# Patient Record
Sex: FEMALE | Race: WHITE | NOT HISPANIC OR LATINO | Employment: FULL TIME | URBAN - METROPOLITAN AREA
[De-identification: names, ages, dates, MRNs, and addresses within clinical notes are randomized per-mention and may not be internally consistent; named-entity substitution may affect disease eponyms.]

---

## 2017-01-06 ENCOUNTER — OFFICE VISIT (OUTPATIENT)
Dept: ORTHOPEDIC SURGERY | Facility: CLINIC | Age: 62
End: 2017-01-06

## 2017-01-06 VITALS — WEIGHT: 130 LBS | BODY MASS INDEX: 21.66 KG/M2 | HEIGHT: 65 IN

## 2017-01-06 DIAGNOSIS — M21.611 BUNION, RIGHT FOOT: ICD-10-CM

## 2017-01-06 DIAGNOSIS — M75.82 ROTATOR CUFF TENDONITIS, LEFT: ICD-10-CM

## 2017-01-06 DIAGNOSIS — M77.41 METATARSALGIA OF RIGHT FOOT: ICD-10-CM

## 2017-01-06 DIAGNOSIS — M20.11 HALLUX VALGUS, RIGHT: ICD-10-CM

## 2017-01-06 DIAGNOSIS — M79.671 RIGHT FOOT PAIN: ICD-10-CM

## 2017-01-06 PROBLEM — M75.80 ROTATOR CUFF TENDONITIS: Status: ACTIVE | Noted: 2017-01-06

## 2017-01-06 PROBLEM — M20.10 HALLUX VALGUS: Status: ACTIVE | Noted: 2017-01-06

## 2017-01-06 PROCEDURE — 73010 X-RAY EXAM OF SHOULDER BLADE: CPT | Performed by: ORTHOPAEDIC SURGERY

## 2017-01-06 PROCEDURE — 99214 OFFICE O/P EST MOD 30 MIN: CPT | Performed by: ORTHOPAEDIC SURGERY

## 2017-01-06 PROCEDURE — 73030 X-RAY EXAM OF SHOULDER: CPT | Performed by: ORTHOPAEDIC SURGERY

## 2017-01-06 PROCEDURE — 73630 X-RAY EXAM OF FOOT: CPT | Performed by: ORTHOPAEDIC SURGERY

## 2017-01-06 PROCEDURE — 20610 DRAIN/INJ JOINT/BURSA W/O US: CPT | Performed by: ORTHOPAEDIC SURGERY

## 2017-01-06 RX ORDER — LEVOTHYROXINE SODIUM 0.07 MG/1
75 TABLET ORAL EVERY MORNING
COMMUNITY
Start: 2016-10-21

## 2017-01-06 RX ORDER — ERGOCALCIFEROL 1.25 MG/1
50000 CAPSULE ORAL
COMMUNITY

## 2017-01-06 RX ORDER — CALCITONIN SALMON 200 [IU]/.09ML
1 SPRAY, METERED NASAL DAILY
COMMUNITY
End: 2018-09-06

## 2017-01-06 RX ORDER — LORAZEPAM 0.5 MG/1
TABLET ORAL
Refills: 0 | COMMUNITY
Start: 2016-11-14 | End: 2018-09-06

## 2017-01-06 RX ORDER — MELOXICAM 15 MG/1
TABLET ORAL
Qty: 60 TABLET | Refills: 0 | Status: SHIPPED | OUTPATIENT
Start: 2017-01-06 | End: 2018-09-06

## 2017-01-06 RX ORDER — BUPIVACAINE HYDROCHLORIDE 5 MG/ML
1 INJECTION, SOLUTION PERINEURAL
Status: COMPLETED | OUTPATIENT
Start: 2017-01-06 | End: 2017-01-06

## 2017-01-06 RX ORDER — ESTROGENS, ESTERIFIED AND METHYLTESTOSTERONE 1.25; 2.5 MG/1; MG/1
1 TABLET, COATED ORAL EVERY MORNING
COMMUNITY
Start: 2016-10-02

## 2017-01-06 RX ORDER — LIDOCAINE HYDROCHLORIDE 10 MG/ML
2 INJECTION, SOLUTION INFILTRATION; PERINEURAL
Status: COMPLETED | OUTPATIENT
Start: 2017-01-06 | End: 2017-01-06

## 2017-01-06 RX ORDER — METHYLPREDNISOLONE ACETATE 80 MG/ML
80 INJECTION, SUSPENSION INTRA-ARTICULAR; INTRALESIONAL; INTRAMUSCULAR; SOFT TISSUE
Status: COMPLETED | OUTPATIENT
Start: 2017-01-06 | End: 2017-01-06

## 2017-01-06 RX ORDER — CITALOPRAM 10 MG/1
10 TABLET ORAL DAILY
COMMUNITY
Start: 2016-10-21 | End: 2018-07-18 | Stop reason: SDUPTHER

## 2017-01-06 RX ADMIN — BUPIVACAINE HYDROCHLORIDE 1 ML: 5 INJECTION, SOLUTION PERINEURAL at 16:31

## 2017-01-06 RX ADMIN — METHYLPREDNISOLONE ACETATE 80 MG: 80 INJECTION, SUSPENSION INTRA-ARTICULAR; INTRALESIONAL; INTRAMUSCULAR; SOFT TISSUE at 16:31

## 2017-01-06 RX ADMIN — LIDOCAINE HYDROCHLORIDE 2 ML: 10 INJECTION, SOLUTION INFILTRATION; PERINEURAL at 16:31

## 2017-01-06 NOTE — PROGRESS NOTES
"Shoulder/Foot Follow Up      Patient: Aislinn Ledesma    YOB: 1955 61 y.o. female    Chief Complaints: My foot and shoulder hurt    History of Present Illness: Patient was seen about 2-1/2 years ago for right foot bunion pain at that time she had radiographic evidence of arthrosis of the right second MTP joint.  Over the last year or so she's had progressive worsening of moderate intermittent aching pain with swelling and burning in the right second toe.  Gets mild to moderate intermittent achy pain over the medial aspect of the first MTP joint as well.  She also reports a 3 month history of moderate intermittent stabbing pain over the superolateral aspect of the left shoulder worse with overhead activities.  No injury of which she is aware  HPI    ROS: Foot pain,  no numbness or tingling  Past Medical History   Diagnosis Date   • Anxiety    • Depression    • Hypothyroidism      Physical Exam:   Vitals:    01/06/17 1523   Weight: 130 lb (59 kg)   Height: 65\" (165.1 cm)     Well developed with normal mood.  Left shoulder shows no warmth erythema or swelling there is discomfort over the superolateral aspect of the left shoulder that is exacerbated with impingement testing.  0-170° of 4 flexion and abduction with 4+ out of 5 rotator cuff strength.  Right foot shows moderate bunion formation with mild tenderness to palpation over the medial eminence.  No pain or instability of the first TMT joint.  60° dorsiflexion 30° plantarflexion without discomfort to the mid range of motion.  There is mild swelling around the right second MTP joint but no warmth or erythema it is somewhat irritable with attempted plantarflexion.  There is minimal hammering of the right second PIP joint and no pain to palpation.  She is nontender many the third metatarsal head.      Radiology: 3 views of the right foot were ordered today to evaluate foot pain reviewed and compared with x-rays from her previous visit.  There is been " some interval change as far as increased arthrosis of the right second metatarsal head.  She has moderate bunion formation with some mild arthritic change of the first MTP joint and some valgus to the proximal phalanx.    3 views left shoulder ordered today to evaluate shoulder pain and reviewed.  There are no prior x-rays for comparison show some mild degenerative change of the acromioclavicular joint some mild undersurface spurring of the acromion.  Humeral head is not high riding      Assessment/Plan:  1.  Right hallux valgus with mild first MTP arthrosis.  2.  Moderate second MTP arthrosis most likely has had some type of Freiberg's infraction in the past or this may be from chronic metatarsal overload with subsequent avascular necrosis.  Reviewed with her from an operative standpoint this would require arthroplasty of the right second metatarsal head and possible PIP resection arthroplasty and extensor tendon lengthening.  She would also need to have bunion correction with modified Vaz bunionectomy with proximal metatarsal osteotomy and probable  phalangeal osteotomy. This is lot for her to think about and we need to get her shoulder under better control first.  Fitted with a metatarsal pad and we'll start her on Mobic 15 mg by mouth daily with GI precautions counseled her that if she has any GI disturbance to stop the medication immediately.    2.  Left shoulder pain probable rotator cuff tendinitis.  She will use anti-inflammatories as outlined above avoid overhead activity and left subacromial space was injected with steroid.  I will see her back in 4-6 weeks.  If the shoulder is not at all improved in 3 weeks she'll let me know and we will get an MRI prior to follow-up.    Large Joint Arthrocentesis  Date/Time: 1/6/2017 4:31 PM  Consent given by: patient  Site marked: site marked  Timeout: Immediately prior to procedure a time out was called to verify the correct patient, procedure, equipment, support  staff and site/side marked as required   Supporting Documentation  Indications: pain   Procedure Details  Location: shoulder - L subacromial bursa  Preparation: Patient was prepped and draped in the usual sterile fashion  Needle size: 22 G  Approach: posterior  Medications administered: 2 mL lidocaine 1 %; 80 mg methylPREDNISolone acetate 80 MG/ML; 1 mL bupivacaine 0.5 %  Patient tolerance: patient tolerated the procedure well with no immediate complications

## 2017-01-06 NOTE — MR AVS SNAPSHOT
Aislinn Baltazar   1/6/2017 2:30 PM   Office Visit    Dept Phone:  [phone]   Encounter #:  [id number]    Provider:  Jae Gonzalez MD   Department:  [facility]                Your Full Care Plan              Today's Medication Changes          These changes are accurate as of: 1/6/17  4:42 PM.  If you have any questions, ask your nurse or doctor.               New Medication(s)Ordered:     meloxicam 15 MG tablet   Commonly known as:  MOBIC   1 PO Daily with food.   Started by:  Jae Gonzalez MD            Where to Get Your Medications      These medications were sent to [facility] [place] [address] - [facility] - [phone]  - [phone]   [address] [place] [id number]     Phone:  [phone]     meloxicam 15 MG tablet                  Your Updated Medication List          This list is accurate as of: 1/6/17  4:42 PM.  Always use your most recent med list.                calcitonin (salmon) 200 UNIT/ACT nasal spray   Commonly known as:  MIACALCIN       citalopram 10 MG tablet   Commonly known as:  CeleXA       COVARYX 1.25-2.5 MG tablet       levothyroxine 75 MCG tablet   Commonly known as:  SYNTHROID, LEVOTHROID       LORazepam 0.5 MG tablet   Commonly known as:  ATIVAN       meloxicam 15 MG tablet   Commonly known as:  MOBIC   1 PO Daily with food.       NEXIUM 40 MG capsule   Generic drug:  esomeprazole       vitamin D 25659 UNITS capsule capsule   Commonly known as:  ERGOCALCIFEROL               We Performed the Following     Large Joint Arthrocentesis     XR Foot 3+ View Right     XR Scapula Left     XR Shoulder 2+ View Left       You Were Diagnosed With        Codes Comments    Bunion, right foot     ICD-10-CM: M21.611  ICD-9-CM: 727.1     Right foot pain     ICD-10-CM: M79.671  ICD-9-CM: 729.5     Metatarsalgia of right foot     ICD-10-CM: M77.41  ICD-9-CM:  "726.70     Hallux valgus, right     ICD-10-CM: M20.11  ICD-9-CM: 735.0     Rotator cuff tendonitis, left     ICD-10-CM: M75.82  ICD-9-CM: 726.10       Instructions     None    Patient Instructions History      Upcoming Appointments     Visit Type Date Time Department    OFFICE VISIT 2017  2:30 PM [facility]    FOLLOW UP 2017  3:20 PM [facility]      [employer] Signup     [employer] allows you to send messages to your doctor, view your test results, renew your prescriptions, schedule appointments, and more. To sign up, go to [employer] and click on the Sign Up Now link in the New User? box. Enter your [employer] Activation Code exactly as it appears below along with the last four digits of your Social Security Number and your Date of Birth () to complete the sign-up process. If you do not sign up before the expiration date, you must request a new code.    [employer] Activation Code: [email or web address]  Expires: 2017  4:42 PM    If you have questions, you can email [email or web address] or call [phone] to talk to our [employer] staff. Remember, [employer] is NOT to be used for urgent needs. For medical emergencies, dial 911.               Other Info from Your Visit           Your Appointments     2017  3:20 PM EST   Follow Up with Jae Gonzalez MD   [facility] [place] [facility] (--)    [address] [place] [address]   [phone]           Arrive 15 minutes prior to appointment.              Allergies     No Known Allergies      Reason for Visit     Right Foot - Establish Care           Vital Signs     Height Weight Body Mass Index Smoking Status          65\" (165.1 cm) 130 lb (59 kg) 21.63 kg/m2 Never Smoker        Problems and Diagnoses Noted     Bunion    Foot pain    Rotator cuff tendinitis    Bunion of great toe of right foot        Right foot pain          Medications Administered     " bupivacaine (MARCAINE) 0.5 % injection 1 mL                  lidocaine (XYLOCAINE) 1 % injection 2 mL                  methylPREDNISolone acetate (DEPO-medrol) injection 80 mg                    Results     XR Foot 3+ View Right           Large Joint Arthrocentesis

## 2017-01-17 ENCOUNTER — TELEPHONE (OUTPATIENT)
Dept: ORTHOPEDIC SURGERY | Facility: CLINIC | Age: 62
End: 2017-01-17

## 2017-01-17 DIAGNOSIS — M75.102 TEAR OF LEFT ROTATOR CUFF, UNSPECIFIED TEAR EXTENT: Primary | ICD-10-CM

## 2017-01-17 NOTE — TELEPHONE ENCOUNTER
Please let her know this has been ordered, she should be contacted about scheduling this and  I will review results at f/u appt

## 2017-02-17 ENCOUNTER — OFFICE VISIT (OUTPATIENT)
Dept: ORTHOPEDIC SURGERY | Facility: CLINIC | Age: 62
End: 2017-02-17

## 2017-02-17 VITALS — TEMPERATURE: 97.9 F | HEIGHT: 65 IN | WEIGHT: 130 LBS | BODY MASS INDEX: 21.66 KG/M2

## 2017-02-17 DIAGNOSIS — M20.11 HALLUX VALGUS, RIGHT: Primary | ICD-10-CM

## 2017-02-17 DIAGNOSIS — M75.42 SHOULDER IMPINGEMENT SYNDROME, LEFT: ICD-10-CM

## 2017-02-17 DIAGNOSIS — M75.82 ROTATOR CUFF TENDONITIS, LEFT: ICD-10-CM

## 2017-02-17 DIAGNOSIS — M77.41 METATARSALGIA OF RIGHT FOOT: ICD-10-CM

## 2017-02-17 PROBLEM — M75.40 SHOULDER IMPINGEMENT SYNDROME: Status: ACTIVE | Noted: 2017-02-17

## 2017-02-17 PROCEDURE — 99214 OFFICE O/P EST MOD 30 MIN: CPT | Performed by: ORTHOPAEDIC SURGERY

## 2017-02-17 RX ORDER — CITALOPRAM 10 MG/1
10 TABLET ORAL NIGHTLY
COMMUNITY

## 2017-02-17 RX ORDER — LEVOTHYROXINE SODIUM 75 UG/1
CAPSULE ORAL
COMMUNITY
End: 2018-09-06

## 2017-02-17 RX ORDER — NAPROXEN SODIUM 220 MG
220 TABLET ORAL AS NEEDED
COMMUNITY

## 2017-02-17 RX ORDER — ALPRAZOLAM 0.5 MG/1
0.5 TABLET ORAL AS NEEDED
COMMUNITY

## 2017-02-17 RX ORDER — ESOMEPRAZOLE MAGNESIUM 40 MG/1
40 FOR SUSPENSION ORAL
COMMUNITY
End: 2018-07-18 | Stop reason: SDUPTHER

## 2017-02-17 RX ORDER — ERGOCALCIFEROL 1.25 MG/1
50000 CAPSULE ORAL
COMMUNITY
End: 2018-07-18 | Stop reason: SDUPTHER

## 2017-02-17 RX ORDER — HYDROXYZINE HYDROCHLORIDE 25 MG/1
25 TABLET, FILM COATED ORAL 3 TIMES DAILY
COMMUNITY
End: 2018-09-06

## 2017-02-17 NOTE — PROGRESS NOTES
"Foot/Shoulder Exam      Patient: Aislinn Ledesma    YOB: 1955 61 y.o. female    Chief Complaints:shoulder hurts, foot feels better    History of Present Illness: Patient is seen back today with a 4-5 month history of moderate intermittent stabbing pain over the superolateral aspect of her left shoulder when she reaches behind to put on her coat or with overhead activities she did not have significant relief with subacromial injection at her last visit and was sent for an MRI.  She's not had any radicular symptoms.  She is also seen back today with some improvement with mild intermittent achy pain in the right forefoot over the second MTP joint and mild discomfort over the first MTP joint she's been improved some with use of meloxicam  HPI    ROS: shoulder pain, foot pain, no numbness or tingling  Past Medical History   Diagnosis Date   • Anxiety    • Depression    • Hypothyroidism        Physical Exam:   Vitals:    02/17/17 1611   Temp: 97.9 °F (36.6 °C)   Weight: 130 lb (59 kg)   Height: 65\" (165.1 cm)     Well developed with normal mood.  Left shoulder shows 0-170° forward flexion and abduction with 4+ out of 5 rotator cuff strength.  There is minimal discomfort over the acromioclavicular joint and minimal pain over the biceps tendon today with speed testing.  She does with impingement testing.  Right foot shows mild swelling over the second MTP joint with some limited motion but really no focal tenderness.  No focal discomfort over the right first MTP joint with range of motion but prominent medial eminence and no pain or instability at the first TMT joint.      Radiology: MRI report of the left shoulder from [facility] dated 1/27/17 was reviewed does show some labral degeneration moderate before meals joint arthrosis downsloping acromion with proximal biceps tendinopathy supraspinatus and subscapularis tendinopathy.  Mild subacromial bursitis and mild subchondral cystic change of the lesser " tuberosity      Assessment/Plan: 1.  Left shoulder subacromial impingement with rotator cuff tendinitis.  She will bring her MRI disc in at her next visit.  I would've hoped that she would've gotten more relief from her injection.  She's not yet ready for surgical debridement and decompression.  She'll continue with meloxicam avoid overhead activity and we'll start some physical therapy her daughter is a therapist and is can try to get working with her.  I do think she may benefit from some modalities as well and will let me know she wants to do formal therapy.  At this point I don't see any sign of radicular pathology.      2.  Left hallux valgus with metatarsus primus varus and second MTP arthrosis.  She's had some improvement with use of meloxicam but will eventually require surgical treatment.  We need to get her shoulder settle down more as she will be on crutches or a scooter after her foot surgery.  I will see her back in 1 month

## 2018-07-18 ENCOUNTER — OFFICE VISIT (OUTPATIENT)
Dept: ORTHOPEDIC SURGERY | Facility: CLINIC | Age: 63
End: 2018-07-18

## 2018-07-18 VITALS — HEIGHT: 65 IN | BODY MASS INDEX: 21.66 KG/M2 | WEIGHT: 130 LBS | TEMPERATURE: 98.2 F

## 2018-07-18 DIAGNOSIS — M19.079 ARTHRITIS OF FOOT: ICD-10-CM

## 2018-07-18 DIAGNOSIS — M79.671 RIGHT FOOT PAIN: ICD-10-CM

## 2018-07-18 DIAGNOSIS — M20.11 HALLUX VALGUS OF RIGHT FOOT: ICD-10-CM

## 2018-07-18 DIAGNOSIS — M92.71 FREIBERG'S DISEASE, RIGHT: Primary | ICD-10-CM

## 2018-07-18 PROCEDURE — 99214 OFFICE O/P EST MOD 30 MIN: CPT | Performed by: ORTHOPAEDIC SURGERY

## 2018-07-18 RX ORDER — DICLOFENAC SODIUM 75 MG/1
75 TABLET, DELAYED RELEASE ORAL 2 TIMES DAILY
COMMUNITY
Start: 2018-06-21

## 2018-07-18 RX ORDER — VALACYCLOVIR HYDROCHLORIDE 1 G/1
1000 TABLET, FILM COATED ORAL AS NEEDED
COMMUNITY
Start: 2018-07-10

## 2018-07-18 NOTE — PROGRESS NOTES
"Foot Follow Up      Patient: Aislinn Ledesma    YOB: 1955 62 y.o. female    Chief Complaints: Foot pain    History of Present Illness: Patient was last seen in February 2017 for her left shoulder and right foot with metatarsus primus varus and second MTP arthrosis that had been improved with use of meloxicam.  She was supposed of followed up in 1 month and has not been seen since.    She states that around the time she went \"back to work\" in November 2017 she's had worsening intermittent aching pain and swelling mainly around the second MTP joint gets only occasional discomfort medially first MTP joint.  Symptoms are worse with walking and standing.    She also notes that last week and half or so she got some type of red \"splotchy\" area over the top of her big toe and is a \"sure what it is\"  HPI    ROS: Foot pain, no numbness or tingling  Past Medical History:   Diagnosis Date   • Anxiety    • Depression    • Hypothyroidism      Physical Exam:   Vitals:    07/18/18 1133   Temp: 98.2 °F (36.8 °C)   Weight: 59 kg (130 lb)   Height: 165.1 cm (65\")     Well developed with normal mood.She is with a friend.  On examination of the right foot there is moderate fullness and swelling but no warmth erythema around the second MTP joint very limited motion.  There is hallux valgus deformity with minimal discomfort over the medial eminence and really no focal pain today with range of motion.  No tenderness or instability of the first TMT joint.  There is about a 1.5 cm x 1 cm splotchy red area over the dorsum of the distal phalanx but no surrounding erythema or fluctuance and really no tenderness to palpation      Radiology: 3 views of right foot were ordered to evaluate pain reviewed and compared with previous x-rays show mild hallux valgus deformity which actually appears less then on previous views but these were not standing views.  Does appear to be some mild arthritic change of the first MTP joint.  There is " been progression of arthritic change with collapse of the second metatarsal head and dorsal subluxation      Assessment/Plan:  1.  Right hallux valgus with metatarsus primus varus  2.  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  Right first toe dorsal distal skin changes of unclear etiology    I reviewed all of this in detail with patient and she will likely eventually need to have surgical treatment for the second toe and the form of a resection arthroplasty.  She's not sure she wants to do anything with her bunion at this point but I suspect she will eventually need to.        That I would not do anything from a surgical standpoint with the skin changes over the dorsum of her toe at this time.    She was fitted with a hammertoe splint to help keep this from dorsiflexing when she walks as this is when it is most painful.    She's going to speak with her PCP about seeing a dermatologist about skin changes on her toe.     Also going get an MRI of her right foot to evaluate the extent of osteonecrosis of the second metatarsal head and arthritic change of the first MTP joint if any.    She understands to call to schedule follow-up appointment after MRI has been scheduled and ordered to review results in the office.  We had a very pleasant visit.  I want standing x-rays of her right foot at follow-up

## 2018-07-26 DIAGNOSIS — M20.11 HALLUX VALGUS OF RIGHT FOOT: ICD-10-CM

## 2018-07-26 DIAGNOSIS — M92.71 FREIBERG'S DISEASE, RIGHT: ICD-10-CM

## 2018-07-27 ENCOUNTER — OFFICE VISIT (OUTPATIENT)
Dept: ORTHOPEDIC SURGERY | Facility: CLINIC | Age: 63
End: 2018-07-27

## 2018-07-27 VITALS — HEIGHT: 65 IN | BODY MASS INDEX: 21.66 KG/M2 | WEIGHT: 130 LBS | TEMPERATURE: 98.3 F

## 2018-07-27 DIAGNOSIS — M19.079 ARTHRITIS OF FOOT: Primary | ICD-10-CM

## 2018-07-27 DIAGNOSIS — M92.71 FREIBERG'S DISEASE, RIGHT: ICD-10-CM

## 2018-07-27 DIAGNOSIS — M77.41 METATARSALGIA OF RIGHT FOOT: ICD-10-CM

## 2018-07-27 DIAGNOSIS — M20.11 HALLUX VALGUS OF RIGHT FOOT: ICD-10-CM

## 2018-07-27 PROCEDURE — 73630 X-RAY EXAM OF FOOT: CPT | Performed by: ORTHOPAEDIC SURGERY

## 2018-07-27 PROCEDURE — 99213 OFFICE O/P EST LOW 20 MIN: CPT | Performed by: ORTHOPAEDIC SURGERY

## 2018-07-27 RX ORDER — CEFAZOLIN SODIUM 2 G/100ML
2 INJECTION, SOLUTION INTRAVENOUS ONCE
Status: CANCELLED | OUTPATIENT
Start: 2018-09-14 | End: 2018-09-14

## 2018-07-27 NOTE — PROGRESS NOTES
"Foot Follow Up      Patient: Aislinn Ledesma    YOB: 1955 62 y.o. 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  She was supposed of followed up in 1 month and did not.  She was last seen now on 7/18/18 stating that since she went back to work in November she had worsening intermittent aching pain and swelling mainly around the second MTP joint more so than third and mild to moderate occasional discomfort around the first MTP joint.  Symptoms are worse with walking and standing.  At this time that I saw her she had a week or 2 of a red \"splotchy area over the top of her big toe.  She has since had an MRI of her foot and has seen a dermatologist and I have reviewed those notes today who felt that this was a postinflammatory hyperpigmentation and did not need any further workup for now.    This area has now resolved  HPI    ROS: Foot pain  Past Medical History:   Diagnosis Date   • Anxiety    • Depression    • Hypothyroidism      Physical Exam:   Vitals:    07/27/18 1534   Temp: 98.3 °F (36.8 °C)   TempSrc: Temporal Artery    Weight: 59 kg (130 lb)   Height: 165.1 cm (65\")     Well developed with normal mood.On exam she has moderate bunion deformity with discomfort over the medial eminence.  No pain or hypermobility at the first TMT joint.  She has 65° dorsiflexion 30° plantarflexion without pain to the mid range of motion and none with axial grind testing.  Moderate discomfort with fullness around the second MTP joint with slight cockup of the second and third more so than fourth and fifth.  She's completely nontender over the fourth and fifth.  She was nontender over the second or third PIP joint.  She has no warmth erythema or \"splotchiness\" to the first toe any longer      Radiology:MRI films and report of the " right foot dated 7/20/18 from [facility] were reviewed which show advanced degenerative change of the second MTP joint felt to be resultant from avascular necrosis and moderate degenerative changes within the first MTP joint with hallux valgus deformity    3 views right foot standing were ordered today which do show some increased hallux valgus deformity measuring around 32 with intermetatarsal angle of 16.5.  There remains significant degenerative change of the second metatarsal with relative elongation of the third.      Assessment/Plan:  1.  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  right hallux valgus with a symptomatic degenerative change of the first MTP joint  3.  right third metatarsalgia    Had a long discussion with her today and asked her fourth and fifth toes are not bothering her and although she does have some degenerative change on MRI clinically she does not and therefore I would not fuse her first MTP joint.  We discussed bunion correction and we'll plan to proceed with this with metatarsal and phalangeal osteotomies with soft tissue balancing and release as needed.    We'll also plan on second metatarsal head debridement and shortening osteotomy of the third.  Her PIP joints are not bothering her so we'll leave those alone nor her fourth and fifth.    We discussed the operative procedure and postoperative protocol in detail and she understands she'll be limited in weightbearing for at least 6 weeks and may be at least 3 months before she may return to work.    Risks benefits potential outcomes and complications were reviewed which can include but are not limited to heart attack stroke death pneumonia infection bleeding damage to blood vessels and nerves or tendons blood clots pulmonary embolism persistent or worsening pain stiffness malunion nonunion recurrence of deformity and failure to return to presurgery and precondition levels  of activity as well as loss of the toes.  Also wound healing complications and may require long-term wound care or plastics reconstruction.    All of her questions are answered to her full satisfaction and we'll plan to proceed with this on an outpatient basis at a mutually convenient time.  She was encouraged to call she has any questions prior to surgery

## 2018-07-30 ENCOUNTER — TELEPHONE (OUTPATIENT)
Dept: ORTHOPEDIC SURGERY | Facility: CLINIC | Age: 63
End: 2018-07-30

## 2018-08-14 ENCOUNTER — TELEPHONE (OUTPATIENT)
Dept: ORTHOPEDIC SURGERY | Facility: CLINIC | Age: 63
End: 2018-08-14

## 2018-08-14 NOTE — TELEPHONE ENCOUNTER
I returned phone call to patient we left several messages and answered all of her questions.  I reviewed the operative procedures with her again in detail including bunion correction and that although she has some arthritic change in the great toe given her relative lack of pain with motion and her overall good range of motion that I would recommend proceeding with a bunion correction and described that in detail to her including multiple osteotomies with realignment and soft tissue balancing and hardware fixation and that should it fail or recur which is always a possibility she would then require fusion.  Regarding her second toe that she has significant arthritic change with collapse that is very painful and we are basically in a salvage mode there as far as resecting the remaining painful cartilage and soft tissue balancing of the toe including hammertoe if needed and that for the third metatarsal will be left relatively long and will be at increased risk for stress fracture or metatarsalgia and therefore recommend shortening it as well.    Discussed with her that she would be nonweightbearing for at least 3-4 weeks and that it would be at least 6-8 weeks before she will be full weightbearing and that certainly no guarantees could be given regarding pain relief but I would expect she should have less pain than she does now.  She will see about getting a knee scooter prior to surgery and discussed with her that although no guarantees could be given I did not know of any other nonoperative measures to provide any more pain relief than what she has had.  Discussed with her that certainly could not guarantee complete pain relief but I expect that her pain will be better than what it is.    Also discussed postoperative pain control with a block and oral medications.    All of her questions are answered to her full satisfaction and she was encouraged to call should questions prior to surgery.  She told me that she  greatly appreciated my calling her

## 2018-08-14 NOTE — TELEPHONE ENCOUNTER
PAT IS SCHED FOR SX NEXT MONTH AND HAS A FEW QUESTIONS,    1. WANTING TO KNOW SUCCESS RATE OF THIS PROCEDURE.    2. RIGHT AFTER SX CAN SHE EXPECT TO HAVE QUITE A BIT OF PAIN DURING RECOVERY?    3. WHAT IS THE WEIGHT BEARING RESTRICTIONS GOING TO BE? NONWB? IF SO, HOW LONG?     4.UNSURE IF SHE WILL BE ABLE TO USE CRUTCHES, WOULD A SCOOTER BE POSSIBLE TO GET?    5. ARE THERE ANY OTHER CONSERVATIVE ALTERNATIVES, NON SURGICAL?    6. WOULD LIKE TO KNOW A LITTLE MORE DETAILS OF THE PROCEDURE.

## 2018-09-06 ENCOUNTER — APPOINTMENT (OUTPATIENT)
Dept: PREADMISSION TESTING | Facility: HOSPITAL | Age: 63
End: 2018-09-06

## 2018-09-06 VITALS
SYSTOLIC BLOOD PRESSURE: 116 MMHG | OXYGEN SATURATION: 98 % | RESPIRATION RATE: 20 BRPM | TEMPERATURE: 98.1 F | HEART RATE: 55 BPM | WEIGHT: 130 LBS | HEIGHT: 65 IN | DIASTOLIC BLOOD PRESSURE: 72 MMHG | BODY MASS INDEX: 21.66 KG/M2

## 2018-09-06 LAB
ANION GAP SERPL CALCULATED.3IONS-SCNC: 9.3 MMOL/L
BUN BLD-MCNC: 17 MG/DL (ref 8–23)
BUN/CREAT SERPL: 23.6 (ref 7–25)
CALCIUM SPEC-SCNC: 9 MG/DL (ref 8.6–10.5)
CHLORIDE SERPL-SCNC: 104 MMOL/L (ref 98–107)
CO2 SERPL-SCNC: 24.7 MMOL/L (ref 22–29)
CREAT BLD-MCNC: 0.72 MG/DL (ref 0.57–1)
DEPRECATED RDW RBC AUTO: 47.5 FL (ref 37–54)
ERYTHROCYTE [DISTWIDTH] IN BLOOD BY AUTOMATED COUNT: 13.8 % (ref 11.7–13)
GFR SERPL CREATININE-BSD FRML MDRD: 82 ML/MIN/1.73
GLUCOSE BLD-MCNC: 100 MG/DL (ref 65–99)
HCT VFR BLD AUTO: 40.8 % (ref 35.6–45.5)
HGB BLD-MCNC: 12.4 G/DL (ref 11.9–15.5)
MCH RBC QN AUTO: 28.6 PG (ref 26.9–32)
MCHC RBC AUTO-ENTMCNC: 30.4 G/DL (ref 32.4–36.3)
MCV RBC AUTO: 94.2 FL (ref 80.5–98.2)
PLATELET # BLD AUTO: 223 10*3/MM3 (ref 140–500)
PMV BLD AUTO: 10.1 FL (ref 6–12)
POTASSIUM BLD-SCNC: 4.2 MMOL/L (ref 3.5–5.2)
RBC # BLD AUTO: 4.33 10*6/MM3 (ref 3.9–5.2)
SODIUM BLD-SCNC: 138 MMOL/L (ref 136–145)
WBC NRBC COR # BLD: 4.54 10*3/MM3 (ref 4.5–10.7)

## 2018-09-06 PROCEDURE — 85027 COMPLETE CBC AUTOMATED: CPT | Performed by: ORTHOPAEDIC SURGERY

## 2018-09-06 PROCEDURE — 80048 BASIC METABOLIC PNL TOTAL CA: CPT | Performed by: ORTHOPAEDIC SURGERY

## 2018-09-06 PROCEDURE — 36415 COLL VENOUS BLD VENIPUNCTURE: CPT

## 2018-09-06 PROCEDURE — 93010 ELECTROCARDIOGRAM REPORT: CPT | Performed by: INTERNAL MEDICINE

## 2018-09-06 PROCEDURE — 93005 ELECTROCARDIOGRAM TRACING: CPT

## 2018-09-06 RX ORDER — CETIRIZINE HYDROCHLORIDE 10 MG/1
10 TABLET ORAL EVERY MORNING
COMMUNITY

## 2018-09-06 RX ORDER — MONTELUKAST SODIUM 10 MG/1
10 TABLET ORAL AS NEEDED
COMMUNITY

## 2018-09-12 ENCOUNTER — TELEPHONE (OUTPATIENT)
Dept: ORTHOPEDIC SURGERY | Facility: CLINIC | Age: 63
End: 2018-09-12

## 2018-12-04 ENCOUNTER — TELEPHONE (OUTPATIENT)
Dept: ORTHOPEDIC SURGERY | Facility: CLINIC | Age: 63
End: 2018-12-04

## 2018-12-17 ENCOUNTER — OFFICE VISIT (OUTPATIENT)
Dept: ORTHOPEDIC SURGERY | Facility: CLINIC | Age: 63
End: 2018-12-17

## 2018-12-17 VITALS — BODY MASS INDEX: 21.83 KG/M2 | TEMPERATURE: 98.9 F | WEIGHT: 131 LBS | HEIGHT: 65 IN

## 2018-12-17 DIAGNOSIS — M20.11 HALLUX VALGUS OF RIGHT FOOT: ICD-10-CM

## 2018-12-17 DIAGNOSIS — M77.41 METATARSALGIA OF RIGHT FOOT: ICD-10-CM

## 2018-12-17 DIAGNOSIS — M92.71 FREIBERG'S DISEASE, RIGHT: Primary | ICD-10-CM

## 2018-12-17 PROCEDURE — 99213 OFFICE O/P EST LOW 20 MIN: CPT | Performed by: ORTHOPAEDIC SURGERY

## 2018-12-17 PROCEDURE — 73630 X-RAY EXAM OF FOOT: CPT | Performed by: ORTHOPAEDIC SURGERY

## 2018-12-17 RX ORDER — RANITIDINE 300 MG/1
TABLET ORAL AS NEEDED
COMMUNITY
Start: 2018-09-20

## 2018-12-17 RX ORDER — PANTOPRAZOLE SODIUM 40 MG/1
TABLET, DELAYED RELEASE ORAL
COMMUNITY
Start: 2018-09-20

## 2018-12-17 NOTE — PROGRESS NOTES
"Foot Follow Up      Patient: Aislinn Ledesma    YOB: 1955 63 y.o. 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  She was supposed of followed up in 1 month and did not.  She wast seen now on 7/18/18 stating that since she went back to work in November she had worsening intermittent aching pain and swelling mainly around the second MTP joint more so than third and mild to moderate occasional discomfort around the first MTP joint.  Symptoms were worse with walking and standing.  At the time that I saw her she had a week or 2 of a red \"splotchy\" area over the top of her big toe.  She had since had an MRI of her foot and has seen a dermatologist and I have reviewed those notes , and the dermatologist  felt that this was a postinflammatory hyperpigmentation and did not need any further workup for now.    At that time we discussed second metatarsal head debridement and shortening osteotomy of the third as well as first MTP bunion correction but without fusion of her first MTP joint as she was not having significant pain with motion of the first MTP joint although she had degenerative change on MRI.  This was scheduled in September that she had to cancel this due to a death in the family.      She is seen back today with moderate aching pain at the second toe at the MTP joint but really only minimal discomfort over the medial eminence of the first MTP joint and no pain beneath the third metatarsal head.          HPI    ROS: Foot pain  Past Medical History:   Diagnosis Date   • Anxiety    • Arthritis    • Depression    • GERD (gastroesophageal reflux disease)    • Hypothyroidism    • Osteopenia    • PONV (postoperative nausea and vomiting)      Physical Exam:   Vitals:    12/17/18 1338   Temp: 98.9 °F (37.2 °C) " "  TempSrc: Temporal   Weight: 59.4 kg (131 lb)   Height: 165.1 cm (65\")     Well developed with normal mood.  On exam she has moderate discomfort and fullness around the second MTP joint with very limited motion and minimal if any deformity of the second PIP joint.  There is moderate hallux valgus deformity that is nearly passively correctable to neutral with minimal discomfort over the medial eminence and really no pain with range of motion today with about 60-70° of dorsiflexion and about 30-40° of plantarflexion but no pain with mid range of motion or axial grind testing.      Radiology: 3 views right foot ordered today to evaluate pain reviewed and compared to previous x-rays.  There is significant hallux valgus deformity with some radiographic degenerative change of the first MTP joint.  I don't see any gapping at the first TMT joint.  There is significant degenerative change of the second metatarsal head with collapse and dorsal subluxation of the second metatarsal.  No obvious stress fracture of the third.      Assessment/Plan:  1.  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  right hallux valgus with asymptomatic degenerative change of the first MTP joint    We discussed treatment options going forward and she said that if it weren't for her second toe she would not consider having anything done with the bunion with the degree of pain that she is having there.  She needs to have at least a resection arthroplasty of the second metatarsal head and understands this could cause transfer metatarsalgia to the third.  Regarding her bunion correction this is somewhat of a difficult situation and that she does have arthritic change and doing some type of proximal osteotomy could exacerbate this causing subsequent need for fusion which she mainly of needing down the road anyway.    Given the complexity of this situation and her symptoms going to get another " opinion from Dr. Jg Slade to see how he would proceed from a surgical standpoint with her foot and she understands the reasoning for this and is in agreement with this in hopes that she would not have to have additional surgery down the road    She was given his name and number and appropriate referral was placed and Epic and she was provided with copies of her x-rays from today.    She'll let me know after she sees him

## 2019-01-25 ENCOUNTER — LAB (OUTPATIENT)
Dept: LAB | Facility: HOSPITAL | Age: 64
End: 2019-01-25
Attending: ORTHOPAEDIC SURGERY

## 2019-01-25 ENCOUNTER — TRANSCRIBE ORDERS (OUTPATIENT)
Dept: ADMINISTRATIVE | Facility: HOSPITAL | Age: 64
End: 2019-01-25

## 2019-01-25 ENCOUNTER — HOSPITAL ENCOUNTER (OUTPATIENT)
Dept: CARDIOLOGY | Facility: HOSPITAL | Age: 64
Discharge: HOME | End: 2019-01-25
Attending: ORTHOPAEDIC SURGERY | Admitting: ORTHOPAEDIC SURGERY

## 2019-01-25 DIAGNOSIS — Z01.818 PRE-OP TESTING: ICD-10-CM

## 2019-01-25 DIAGNOSIS — Z01.818 PRE-OP TESTING: Primary | ICD-10-CM

## 2019-01-25 LAB
ALBUMIN SERPL-MCNC: 4 G/DL (ref 3.5–5.2)
ALBUMIN/GLOB SERPL: 1.2 G/DL
ALP SERPL-CCNC: 50 U/L (ref 39–117)
ALT SERPL W P-5'-P-CCNC: 21 U/L (ref 1–33)
ANION GAP SERPL CALCULATED.3IONS-SCNC: 8.4 MMOL/L
AST SERPL-CCNC: 30 U/L (ref 1–32)
BASOPHILS # BLD AUTO: 0.02 10*3/MM3 (ref 0–0.2)
BASOPHILS NFR BLD AUTO: 0.2 % (ref 0–1.5)
BILIRUB SERPL-MCNC: 0.3 MG/DL (ref 0.1–1.2)
BUN BLD-MCNC: 12 MG/DL (ref 8–23)
BUN/CREAT SERPL: 14.8 (ref 7–25)
CALCIUM SPEC-SCNC: 9.3 MG/DL (ref 8.6–10.5)
CHLORIDE SERPL-SCNC: 102 MMOL/L (ref 98–107)
CO2 SERPL-SCNC: 28.6 MMOL/L (ref 22–29)
CREAT BLD-MCNC: 0.81 MG/DL (ref 0.57–1)
DEPRECATED RDW RBC AUTO: 49 FL (ref 37–54)
EOSINOPHIL # BLD AUTO: 0.22 10*3/MM3 (ref 0–0.7)
EOSINOPHIL NFR BLD AUTO: 2.6 % (ref 0.3–6.2)
ERYTHROCYTE [DISTWIDTH] IN BLOOD BY AUTOMATED COUNT: 14.3 % (ref 11.7–13)
GFR SERPL CREATININE-BSD FRML MDRD: 72 ML/MIN/1.73
GLOBULIN UR ELPH-MCNC: 3.4 GM/DL
GLUCOSE BLD-MCNC: 88 MG/DL (ref 65–99)
HCT VFR BLD AUTO: 41.4 % (ref 35.6–45.5)
HGB BLD-MCNC: 13 G/DL (ref 11.9–15.5)
IMM GRANULOCYTES # BLD AUTO: 0.01 10*3/MM3 (ref 0–0.03)
IMM GRANULOCYTES NFR BLD AUTO: 0.1 % (ref 0–0.5)
LYMPHOCYTES # BLD AUTO: 1.96 10*3/MM3 (ref 0.9–4.8)
LYMPHOCYTES NFR BLD AUTO: 22.9 % (ref 19.6–45.3)
MCH RBC QN AUTO: 29.4 PG (ref 26.9–32)
MCHC RBC AUTO-ENTMCNC: 31.4 G/DL (ref 32.4–36.3)
MCV RBC AUTO: 93.7 FL (ref 80.5–98.2)
MONOCYTES # BLD AUTO: 0.57 10*3/MM3 (ref 0.2–1.2)
MONOCYTES NFR BLD AUTO: 6.7 % (ref 5–12)
NEUTROPHILS # BLD AUTO: 5.78 10*3/MM3 (ref 1.9–8.1)
NEUTROPHILS NFR BLD AUTO: 67.6 % (ref 42.7–76)
PLATELET # BLD AUTO: 272 10*3/MM3 (ref 140–500)
PMV BLD AUTO: 9.8 FL (ref 6–12)
POTASSIUM BLD-SCNC: 3.8 MMOL/L (ref 3.5–5.2)
PROT SERPL-MCNC: 7.4 G/DL (ref 6–8.5)
RBC # BLD AUTO: 4.42 10*6/MM3 (ref 3.9–5.2)
SODIUM BLD-SCNC: 139 MMOL/L (ref 136–145)
WBC NRBC COR # BLD: 8.55 10*3/MM3 (ref 4.5–10.7)

## 2019-01-25 PROCEDURE — 85025 COMPLETE CBC W/AUTO DIFF WBC: CPT

## 2019-01-25 PROCEDURE — 80053 COMPREHEN METABOLIC PANEL: CPT

## 2019-01-25 PROCEDURE — 93005 ELECTROCARDIOGRAM TRACING: CPT | Performed by: ORTHOPAEDIC SURGERY

## 2019-01-25 PROCEDURE — 93010 ELECTROCARDIOGRAM REPORT: CPT | Performed by: INTERNAL MEDICINE

## 2019-01-25 PROCEDURE — 36415 COLL VENOUS BLD VENIPUNCTURE: CPT

## 2019-02-15 ENCOUNTER — TELEPHONE (OUTPATIENT)
Dept: ORTHOPEDIC SURGERY | Facility: CLINIC | Age: 64
End: 2019-02-15

## 2019-02-15 NOTE — TELEPHONE ENCOUNTER
I called to check on patient after discussing her with Dr. Slade.  About 2 weeks ago she had surgery on her foot and is doing well from that.  She appreciated me calling her and if she has any further problems with the other foot total be happy to take a look at that.

## 2019-02-19 NOTE — DISCHARGE INSTRUCTIONS
Anesthesia ROS/Med Hx        Pulmonary Review:    Negative for pulmonary    Cardiovascular Review:    Pt. negative for all cardio ROS    End/Other Review:    Pt. negative for endo/other ROS      Anesthesia Plan     ASA Status: 1  Anesthesia Type: MAC  The proposed anesthetic plan, including its risks and benefits, have been discussed with the Patient - along with the risks and benefits of alternatives. Questions were encouraged and answered and the patient and/or representative agrees to proceed.       Physical Exam  Mallampati: II  TM Distance: >3 FB  Neck ROM: Full  cardiovascular exam normal  pulmonary exam normal  abdominal exam normal Take the following medications the morning of surgery with a small sip of water:  esomeprazole        General Instructions:  • Do not eat solid food after midnight the night before surgery.  • You may drink clear liquids day of surgery but must stop at least one hour before your hospital arrival time.  • It is beneficial for you to have a clear drink that contains carbohydrates the day of surgery.  We suggest a 12 to 20 ounce bottle of Gatorade or Powerade for non-diabetic patients or a 12 to 20 ounce bottle of G2 or Powerade Zero for diabetic patients. (Pediatric patients, are not advised to drink a 12 to 20 ounce carbohydrate drink)    Clear liquids are liquids you can see through.  Nothing red in color.     Plain water                               Sports drinks  Sodas                                   Gelatin (Jell-O)  Fruit juices without pulp such as white grape juice and apple juice  Popsicles that contain no fruit or yogurt  Tea or coffee (no cream or milk added)  Gatorade / Powerade  G2 / Powerade Zero    • Infants may have breast milk up to four hours before surgery.  • Infants drinking formula may drink formula up to six hours before surgery.   • Patients who avoid smoking, chewing tobacco and alcohol for 4 weeks prior to surgery have a reduced risk of post-operative complications.  Quit smoking as many days before surgery as you can.  • Do not smoke, use chewing tobacco or drink alcohol the day of surgery.   • If applicable bring your C-PAP/ BI-PAP machine.  • Bring any papers given to you in the doctor’s office.  • Wear clean comfortable clothes and socks.  • Do not wear contact lenses or make-up.  Bring a case for your glasses.   • Bring crutches or walker if applicable.  • Remove all piercings.  Leave jewelry and any other valuables at home.  • Hair extensions with metal clips must be removed prior to surgery.  • The Pre-Admission Testing nurse will instruct you to bring medications if unable to  obtain an accurate list in Pre-Admission Testing.        If you were given a blood bank ID arm band remember to bring it with you the day of surgery.    Preventing a Surgical Site Infection:  • For 2 to 3 days before surgery, avoid shaving with a razor because the razor can irritate skin and make it easier to develop an infection.    • Any areas of open skin can increase the risk of a post-operative wound infection by allowing bacteria to enter and travel throughout the body.  Notify your surgeon if you have any skin wounds / rashes even if it is not near the expected surgical site.  The area will need assessed to determine if surgery should be delayed until it is healed.  • The night prior to surgery sleep in a clean bed with clean clothing.  Do not allow pets to sleep with you.  • Shower on the morning of surgery using a fresh bar of anti-bacterial soap (such as Dial) and clean washcloth.  Dry with a clean towel and dress in clean clothing.  • Ask your surgeon if you will be receiving antibiotics prior to surgery.  • Make sure you, your family, and all healthcare providers clean their hands with soap and water or an alcohol based hand  before caring for you or your wound.    Day of surgery:  Upon arrival, a Pre-op nurse and Anesthesiologist will review your health history, obtain vital signs, and answer questions you may have.  The only belongings needed at this time will be your home medications and if applicable your C-PAP/BI-PAP machine.  If you are staying overnight your family can leave the rest of your belongings in the car and bring them to your room later.  A Pre-op nurse will start an IV and you may receive medication in preparation for surgery, including something to help you relax.  Your family will be able to see you in the Pre-op area.  While you are in surgery your family should notify the waiting room  if they leave the waiting room area and provide a contact phone  number.    Please be aware that surgery does come with discomfort.  We want to make every effort to control your discomfort so please discuss any uncontrolled symptoms with your nurse.   Your doctor will most likely have prescribed pain medications.      If you are going home after surgery you will receive individualized written care instructions before being discharged.  A responsible adult must drive you to and from the hospital on the day of your surgery and stay with you for 24 hours.    If you are staying overnight following surgery, you will be transported to your hospital room following the recovery period.  [facility] has all private rooms.    You have received a list of surgical assistants for your reference.  If you have any questions please call Pre-Admission Testing at [phone].  Deductibles and co-payments are collected on the day of service. Please be prepared to pay the required co-pay, deductible or deposit on the day of service as defined by your plan.

## 2019-07-25 ENCOUNTER — OFFICE VISIT (OUTPATIENT)
Dept: ORTHOPEDIC SURGERY | Facility: CLINIC | Age: 64
End: 2019-07-25

## 2019-07-25 VITALS — WEIGHT: 131 LBS | BODY MASS INDEX: 21.83 KG/M2 | TEMPERATURE: 98 F | HEIGHT: 65 IN

## 2019-07-25 DIAGNOSIS — M77.41 METATARSALGIA OF RIGHT FOOT: ICD-10-CM

## 2019-07-25 DIAGNOSIS — Z09 FOLLOW UP: Primary | ICD-10-CM

## 2019-07-25 DIAGNOSIS — M20.41 HAMMER TOE OF RIGHT FOOT: ICD-10-CM

## 2019-07-25 PROCEDURE — 73630 X-RAY EXAM OF FOOT: CPT | Performed by: ORTHOPAEDIC SURGERY

## 2019-07-25 PROCEDURE — 99213 OFFICE O/P EST LOW 20 MIN: CPT | Performed by: ORTHOPAEDIC SURGERY

## 2019-07-25 NOTE — PROGRESS NOTES
"Foot Follow Up      Patient: Aislinn Ledesma    YOB: 1955 63 y.o. 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    She was sent to see Dr. Slade and saw him for another opinion and in January of this year had fusion of her right first MTP joint and second hammertoe procedure but nothing on the third.    She reports progressive deformity of the third and fourth toes with discomfort in the third toe as well as intermittent discomfort in the second toe that is still slightly elevated.  She has no complaints of pain in the first MTP joint.    There is also been some hammering to the fifth toe but is not bothersome to her  HPI    ROS: Foot pain  Past Medical History:   Diagnosis Date   • Anxiety    • Arthritis    • Depression    • GERD (gastroesophageal reflux disease)    • Hypothyroidism    • Osteopenia    • PONV (postoperative nausea and vomiting)      Physical Exam:   Vitals:    07/25/19 1304   Temp: 98 °F (36.7 °C)   TempSrc: Temporal   Weight: 59.4 kg (131 lb)   Height: 165.1 cm (65\")     Well developed with normal mood.  Exam she has well-healed incisions of the right foot with neutral alignment of the first toe with no discomfort.  There is slightly elevated second toe with somewhat limited motion of the second MTP joint.  There is some mild cock-up of the third and fourth more so than fifth toe with some hammering at the third and fourth PIP joints.  No plantar callus      Radiology: 3 views of the right foot ordered to evaluate pain and alignment reviewed and compared to previous x-rays she had interval fusion of her first MTP joint which appears well aligned and fused..  She is had decompression and osteotomy of the second metatarsal head with chronic degenerative change " with slight dorsal subluxation but improved alignment.  There is been second PIP resection with appropriate alignment.  There is some relative elongation of the third metatarsal but no evidence of stress fracture.  There is curling of the fourth third and fifth toes as well.      Assessment/Plan: 1.  Status post right first MTP fusion and second hammertoe procedure by Dr. Slade  2.  Right third and fourth hammertoe with third metatarsalgia and asymptomatic fifth hammertoe    Reviewed treatment options with her today and this is a very difficult situation.  Regarding the second toe there is not really a good reliable procedure could contemplate hardware removal and partial metatarsal head resection and possibly even a flexor to extensor tendon transfer but this may not relieve pain.    Regarding her lesser toes could do a more traditional hammertoe procedure for those with MTP release possible metatarsal osteotomies and PIP fusions.    She  is due to see Dr. Slade today and discussed treatment options and will let me know what he says and will decide what to do going forward.

## 2020-06-02 ENCOUNTER — HOSPITAL ENCOUNTER (OUTPATIENT)
Dept: OTHER | Facility: HOSPITAL | Age: 65
Discharge: HOME | End: 2020-06-02
Attending: GENERAL PRACTICE

## 2023-07-28 PROCEDURE — 88321 CONSLTJ&REPRT SLD PREP ELSWR: CPT | Performed by: PATHOLOGY

## 2023-08-21 ENCOUNTER — TRANSCRIBE ORDERS (OUTPATIENT)
Dept: ADMINISTRATIVE | Facility: HOSPITAL | Age: 68
End: 2023-08-21
Payer: OTHER GOVERNMENT

## 2023-08-21 DIAGNOSIS — C50.919 MALIGNANT NEOPLASM OF FEMALE BREAST, UNSPECIFIED ESTROGEN RECEPTOR STATUS, UNSPECIFIED LATERALITY, UNSPECIFIED SITE OF BREAST: ICD-10-CM

## 2023-08-21 DIAGNOSIS — R92.1 BREAST CALCIFICATIONS: Primary | ICD-10-CM

## 2024-03-15 ENCOUNTER — CONSULT (OUTPATIENT)
Dept: RADIATION ONCOLOGY | Facility: HOSPITAL | Age: 69
End: 2024-03-15
Payer: MEDICARE

## 2024-03-15 ENCOUNTER — HOSPITAL ENCOUNTER (OUTPATIENT)
Dept: RADIATION ONCOLOGY | Facility: HOSPITAL | Age: 69
Setting detail: RADIATION/ONCOLOGY SERIES
End: 2024-03-15
Payer: MEDICARE

## 2024-03-15 VITALS
SYSTOLIC BLOOD PRESSURE: 119 MMHG | TEMPERATURE: 99.2 F | BODY MASS INDEX: 20.29 KG/M2 | RESPIRATION RATE: 16 BRPM | HEART RATE: 63 BPM | DIASTOLIC BLOOD PRESSURE: 80 MMHG | OXYGEN SATURATION: 98 % | WEIGHT: 121.91 LBS

## 2024-03-15 DIAGNOSIS — Z17.0 MALIGNANT NEOPLASM OF UPPER-INNER QUADRANT OF RIGHT BREAST IN FEMALE, ESTROGEN RECEPTOR POSITIVE: Primary | ICD-10-CM

## 2024-03-15 DIAGNOSIS — C50.211 MALIGNANT NEOPLASM OF UPPER-INNER QUADRANT OF RIGHT BREAST IN FEMALE, ESTROGEN RECEPTOR POSITIVE: Primary | ICD-10-CM

## 2024-03-15 NOTE — PROGRESS NOTES
New Patient Office Visit      Encounter Date: 03/15/2024   Patient Name: Aislinn Ledesma  YOB: 1955   Medical Record Number: [id number]   Primary Diagnosis: Malignant neoplasm of upper-inner quadrant of right breast in female, estrogen receptor positive [C50.211, Z17.0]         Chief Complaint:    Chief Complaint   Patient presents with    Consult    Breast Cancer       History of Present Illness: Aislinn Ledesma is a 68-year-old female with invasive lobular carcinoma of the right breast status post right breast lumpectomy and axillary dissection who is seen in consultation regarding radiotherapy as a component of management.  Mrs. Ledesma underwent screening mammography on 7/21/2023 revealing new focal calcifications within the right breast.  Diagnostic mammography performed on 7/28/2023 revealed a 1.7 cm mass at the 3 o'clock position and potentially for enlarged axillary lymph nodes.  Pathology from biopsy of this lesion within the right breast revealed invasive carcinoma with both ductal and lobular features, ER positive/NC positive, HER2/nguyen negative with Ki-67 less than 10%.  Pathology from biopsy of the axillary lymph node revealed fibrofatty tissue with a focus of carcinoma.  CT scan of the chest abdomen pelvis as well as bone scan was performed on 8/25/2023 revealing a single 7 mm pulmonary nodule within the right upper lobe not amenable to biopsy.  Pathology from right breast lumpectomy and sentinel lymph node biopsy performed on 9/14/2023 revealed invasive lobular carcinoma, grade 2 measuring 16 mm in greatest dimension with 2 sentinel lymph nodes positive for carcinoma and no extranodal extension.  There was a lateral positive margin measuring over 8 mm.  Repeat excision was performed on 11/2/2023 revealing residual invasive lobular carcinoma, grade 1 measuring 1.2 mm in greatest dimension with a new positive margin is a less than 1 mm focus.  A total of 4 lymph nodes were removed at  that surgery with 2 revealing metastatic disease.  Mrs. Ledesma commenced adjuvant chemotherapy with docetaxel/cyclophosphamide on 12/5/2023, completing 4 cycles to date.  She reports feeling well overall and is recovering from the acute toxicities of chemotherapy.  Subjective      Review of Systems: Review of Systems   Constitutional:  Positive for fatigue. Negative for appetite change.   HENT:  Negative for sore throat, tinnitus and trouble swallowing.    Eyes:  Negative for visual disturbance.   Respiratory:  Positive for cough (occasionally productive). Negative for shortness of breath.    Cardiovascular:  Negative for chest pain and palpitations.   Gastrointestinal:  Positive for constipation (occasional takes otc medication). Negative for abdominal pain, anal bleeding, blood in stool, diarrhea, nausea and rectal pain.   Genitourinary:  Negative for difficulty urinating, dysuria, frequency and urgency.   Musculoskeletal:  Positive for back pain. Negative for arthralgias.   Skin:  Negative for rash.   Neurological:  Negative for dizziness and headaches.   Psychiatric/Behavioral:  Positive for sleep disturbance (wakes throughout the night). Negative for agitation, self-injury and suicidal ideas.        Past Medical History:   Past Medical History:   Diagnosis Date    Anxiety     Arthritis     Depression     GERD (gastroesophageal reflux disease)     Hypothyroidism     Osteopenia     PONV (postoperative nausea and vomiting)        Past Surgical History:   Past Surgical History:   Procedure Laterality Date    BREAST LUMPECTOMY      DIAGNOSTIC LAPAROSCOPY      ovarian cyst    EYE SURGERY      cesar pre anderson lens placement    HYSTERECTOMY         Family History:   Family History   Problem Relation Age of Onset    Cancer Sister     Cancer Other     Malig Hyperthermia Neg Hx        Social History:   Social History     Socioeconomic History    Marital status:    Tobacco Use    Smoking status: Former     Current  packs/day: 0.00     Average packs/day: 2.0 packs/day for 13.0 years (26.0 ttl pk-yrs)     Types: Cigarettes     Start date:      Quit date:      Years since quittin.2    Smokeless tobacco: Never   Vaping Use    Vaping status: Never Used   Substance and Sexual Activity    Alcohol use: No    Drug use: No    Sexual activity: Defer       Medications:     Current Outpatient Medications:     citalopram (CeleXA) 10 MG tablet, Take 1 tablet by mouth Every Night., Disp: , Rfl:     levothyroxine (SYNTHROID, LEVOTHROID) 75 MCG tablet, 1 tablet Every Morning., Disp: , Rfl:     naproxen sodium (ALEVE) 220 MG tablet, Take 1 tablet by mouth As Needed., Disp: , Rfl:     pantoprazole (PROTONIX) 40 MG EC tablet, , Disp: , Rfl:     raNITIdine (ZANTAC) 300 MG tablet, As Needed., Disp: , Rfl:     vitamin D (ERGOCALCIFEROL) 38148 UNITS capsule capsule, Take 1 capsule by mouth Every 7 (Seven) Days. saturday, Disp: , Rfl:     cetirizine (zyrTEC) 10 MG tablet, Take 10 mg by mouth Every Morning. (Patient not taking: Reported on 3/15/2024), Disp: , Rfl:     montelukast (SINGULAIR) 10 MG tablet, Take 10 mg by mouth As Needed. (Patient not taking: Reported on 3/15/2024), Disp: , Rfl:     Allergies:   No Known Allergies    Pain: (on a scale of 0-10)   Pain Score    03/15/24 1421   PainSc: 0-No pain       Aislinn Ledesma reports a pain score of 0.  Given her pain assessment as noted, treatment options were discussed and the following options were decided upon as a follow-up plan to address the patient's pain: continuation of current treatment plan for pain.     Advanced Care Plan: N   Quality of Life: 100 - Full Activity    Objective     Physical Exam:   Vital Signs:   Vitals:    03/15/24 1421   BP: 119/80   Pulse: 63   Resp: 16   Temp: 99.2 °F (37.3 °C)   TempSrc: Temporal   SpO2: 98%   Weight: 55.3 kg (121 lb 14.6 oz)   PainSc: 0-No pain     Body mass index is 20.29 kg/m².     Physical Exam  Constitutional:       General: She is  not in acute distress.     Appearance: Normal appearance. She is normal weight. She is not ill-appearing, toxic-appearing or diaphoretic.   HENT:      Head: Normocephalic and atraumatic.   Pulmonary:      Effort: Pulmonary effort is normal. No respiratory distress.   Skin:     General: Skin is warm and dry.      Coloration: Skin is not jaundiced.   Neurological:      General: No focal deficit present.      Mental Status: She is alert and oriented to person, place, and time.      Cranial Nerves: No cranial nerve deficit.      Gait: Gait normal.   Psychiatric:         Mood and Affect: Mood normal.         Behavior: Behavior normal.         Judgment: Judgment normal.         Results:   Radiographs: I personally reviewed the results from the CT scan of the chest, abdomen and pelvis from 8/25/2023.  The pertinent findings are as above in HPI.      Pathology: I personally reviewed the pathology reports from the procedure performed on 9/14/2023 and 11/2/2023.  The pertinent findings are as above in HPI.      Labs:   WBC   Date Value Ref Range Status   08/17/2023 5.76 4.5 - 11.0 10*3/uL Final     Hemoglobin   Date Value Ref Range Status   08/17/2023 14.1 12.0 - 16.0 g/dL Final     Hematocrit   Date Value Ref Range Status   08/17/2023 44.8 36.0 - 46.0 % Final     Platelets   Date Value Ref Range Status   08/17/2023 240 140 - 440 10*3/uL Final       Assessment / Plan      Assessment/Plan:   Aislinn Ledesma is a 68-year-old female with initial cT1c cN1 cM0 invasive ductal carcinoma with lobular features involving the right breast.  She is status post right breast lumpectomy with sentinel lymph node biopsy and subsequent repeat excision with additional lymph node dissection; invasive lobular carcinoma, grade 2, ER positive/IN positive, HER2/nguyen negative, pT1c pN2.  She completed adjuvant chemotherapy with Taxotere/Cytoxan.  ECOG 0    I discussed the clinical, radiographic and pathologic findings to date with Mrs. Ledesma and her  .  I explained the role of radiotherapy in the adjuvant management of breast cancer for patients with positive axillary lymph nodes we have also undergone breast conserving therapy.  I recommended external beam radiotherapy to the right breast and right axilla, outlining the rationale for this approach.  I recommended external beam radiotherapy to the right breast and axilla followed by a boost to the lumpectomy cavity.  I explained that additional radiotherapy (lumpectomy boost) in the setting of positive margins is not traditionally accepted as improving outcomes for patients but could possibly confer a benefit with regard to local control as appreciated in the EORTC boost trial in which delivery of a boost yielded a decreased rate of ipsilateral breast tumor recurrence for patients with a positive margin.  This finding did not reach statistical significance potentially due to a small sample size in the study but there is still may be some validity to delivery of a boost.  We discussed the potential acute and chronic toxicities of external beam radiotherapy to the right breast and axilla.  Mrs. Ledesma is agreeable to my recommendation and will undergo CT simulation for treatment planning purposes.        Néstor Wilson MD  Radiation Oncology  [facility]    This document has been signed by Néstor Wilson MD on March 15, 2024 16:43 EDT

## 2024-03-18 ENCOUNTER — HOSPITAL ENCOUNTER (OUTPATIENT)
Dept: RADIATION ONCOLOGY | Facility: HOSPITAL | Age: 69
Discharge: HOME | End: 2024-03-18
Payer: MEDICARE

## 2024-03-18 DIAGNOSIS — C50.411 MALIGNANT NEOPLASM OF UPPER-OUTER QUADRANT OF RIGHT FEMALE BREAST, UNSPECIFIED ESTROGEN RECEPTOR STATUS: ICD-10-CM

## 2024-03-18 PROCEDURE — 77263 THER RADIOLOGY TX PLNG CPLX: CPT | Performed by: RADIOLOGY

## 2024-03-18 PROCEDURE — 77290 THER RAD SIMULAJ FIELD CPLX: CPT | Performed by: RADIOLOGY

## 2024-03-18 PROCEDURE — 77332 RADIATION TREATMENT AID(S): CPT | Performed by: RADIOLOGY

## 2024-03-25 ENCOUNTER — HOSPITAL ENCOUNTER (OUTPATIENT)
Dept: RADIATION ONCOLOGY | Facility: HOSPITAL | Age: 69
Discharge: HOME | End: 2024-03-25
Payer: MEDICARE

## 2024-03-25 PROCEDURE — 77280 THER RAD SIMULAJ FIELD SMPL: CPT | Performed by: RADIOLOGY

## 2024-03-25 PROCEDURE — 77300 RADIATION THERAPY DOSE PLAN: CPT | Performed by: RADIOLOGY

## 2024-03-25 PROCEDURE — 77334 RADIATION TREATMENT AID(S): CPT | Performed by: RADIOLOGY

## 2024-03-25 PROCEDURE — 77295 3-D RADIOTHERAPY PLAN: CPT | Performed by: RADIOLOGY

## 2024-03-26 ENCOUNTER — HOSPITAL ENCOUNTER (OUTPATIENT)
Dept: RADIATION ONCOLOGY | Facility: HOSPITAL | Age: 69
Discharge: HOME | End: 2024-03-26

## 2024-03-26 LAB
RAD ONC ARIA COURSE ID: NORMAL
RAD ONC ARIA COURSE INTENT: NORMAL
RAD ONC ARIA COURSE LAST TREATMENT DATE: NORMAL
RAD ONC ARIA COURSE START DATE: NORMAL
RAD ONC ARIA COURSE TREATMENT ELAPSED DAYS: 0
RAD ONC ARIA FIRST TREATMENT DATE: NORMAL
RAD ONC ARIA PLAN FRACTIONS TREATED TO DATE: 1
RAD ONC ARIA PLAN FRACTIONS TREATED TO DATE: 1
RAD ONC ARIA PLAN ID: NORMAL
RAD ONC ARIA PLAN ID: NORMAL
RAD ONC ARIA PLAN PRESCRIBED DOSE PER FRACTION: 2 GY
RAD ONC ARIA PLAN PRESCRIBED DOSE PER FRACTION: 2 GY
RAD ONC ARIA PLAN PRIMARY REFERENCE POINT: NORMAL
RAD ONC ARIA PLAN PRIMARY REFERENCE POINT: NORMAL
RAD ONC ARIA PLAN TOTAL FRACTIONS PRESCRIBED: 25
RAD ONC ARIA PLAN TOTAL FRACTIONS PRESCRIBED: 25
RAD ONC ARIA PLAN TOTAL PRESCRIBED DOSE: 5000 CGY
RAD ONC ARIA PLAN TOTAL PRESCRIBED DOSE: 5000 CGY
RAD ONC ARIA REFERENCE POINT DOSAGE GIVEN TO DATE: 2 GY
RAD ONC ARIA REFERENCE POINT DOSAGE GIVEN TO DATE: 2 GY
RAD ONC ARIA REFERENCE POINT ID: NORMAL
RAD ONC ARIA REFERENCE POINT ID: NORMAL
RAD ONC ARIA REFERENCE POINT SESSION DOSAGE GIVEN: 2 GY
RAD ONC ARIA REFERENCE POINT SESSION DOSAGE GIVEN: 2 GY

## 2024-03-26 PROCEDURE — 77427 RADIATION TX MANAGEMENT X5: CPT | Performed by: RADIOLOGY

## 2024-03-26 PROCEDURE — 77387 GUIDANCE FOR RADJ TX DLVR: CPT | Performed by: RADIOLOGY

## 2024-03-26 PROCEDURE — G6002 STEREOSCOPIC X-RAY GUIDANCE: HCPCS | Performed by: RADIOLOGY

## 2024-03-26 PROCEDURE — 77412 RADIATION TX DELIVERY LVL 3: CPT | Performed by: RADIOLOGY

## 2024-03-27 ENCOUNTER — HOSPITAL ENCOUNTER (OUTPATIENT)
Dept: RADIATION ONCOLOGY | Facility: HOSPITAL | Age: 69
Discharge: HOME | End: 2024-03-27

## 2024-03-27 DIAGNOSIS — Z17.0 MALIGNANT NEOPLASM OF UPPER-INNER QUADRANT OF RIGHT BREAST IN FEMALE, ESTROGEN RECEPTOR POSITIVE: Primary | ICD-10-CM

## 2024-03-27 DIAGNOSIS — C50.211 MALIGNANT NEOPLASM OF UPPER-INNER QUADRANT OF RIGHT BREAST IN FEMALE, ESTROGEN RECEPTOR POSITIVE: Primary | ICD-10-CM

## 2024-03-27 LAB
RAD ONC ARIA COURSE ID: NORMAL
RAD ONC ARIA COURSE INTENT: NORMAL
RAD ONC ARIA COURSE LAST TREATMENT DATE: NORMAL
RAD ONC ARIA COURSE START DATE: NORMAL
RAD ONC ARIA COURSE TREATMENT ELAPSED DAYS: 1
RAD ONC ARIA FIRST TREATMENT DATE: NORMAL
RAD ONC ARIA PLAN FRACTIONS TREATED TO DATE: 2
RAD ONC ARIA PLAN FRACTIONS TREATED TO DATE: 2
RAD ONC ARIA PLAN ID: NORMAL
RAD ONC ARIA PLAN ID: NORMAL
RAD ONC ARIA PLAN PRESCRIBED DOSE PER FRACTION: 2 GY
RAD ONC ARIA PLAN PRESCRIBED DOSE PER FRACTION: 2 GY
RAD ONC ARIA PLAN PRIMARY REFERENCE POINT: NORMAL
RAD ONC ARIA PLAN PRIMARY REFERENCE POINT: NORMAL
RAD ONC ARIA PLAN TOTAL FRACTIONS PRESCRIBED: 25
RAD ONC ARIA PLAN TOTAL FRACTIONS PRESCRIBED: 25
RAD ONC ARIA PLAN TOTAL PRESCRIBED DOSE: 5000 CGY
RAD ONC ARIA PLAN TOTAL PRESCRIBED DOSE: 5000 CGY
RAD ONC ARIA REFERENCE POINT DOSAGE GIVEN TO DATE: 4 GY
RAD ONC ARIA REFERENCE POINT DOSAGE GIVEN TO DATE: 4 GY
RAD ONC ARIA REFERENCE POINT ID: NORMAL
RAD ONC ARIA REFERENCE POINT ID: NORMAL
RAD ONC ARIA REFERENCE POINT SESSION DOSAGE GIVEN: 2 GY
RAD ONC ARIA REFERENCE POINT SESSION DOSAGE GIVEN: 2 GY

## 2024-03-27 PROCEDURE — 77412 RADIATION TX DELIVERY LVL 3: CPT | Performed by: RADIOLOGY

## 2024-03-27 PROCEDURE — G6002 STEREOSCOPIC X-RAY GUIDANCE: HCPCS | Performed by: RADIOLOGY

## 2024-03-27 PROCEDURE — 77387 GUIDANCE FOR RADJ TX DLVR: CPT | Performed by: RADIOLOGY

## 2024-03-27 RX ORDER — LEVOTHYROXINE SODIUM 88 UG/1
TABLET ORAL
COMMUNITY

## 2024-03-27 RX ORDER — CLONAZEPAM 0.5 MG/1
0.5 TABLET ORAL
COMMUNITY

## 2024-03-27 RX ORDER — LETROZOLE 2.5 MG/1
TABLET, FILM COATED ORAL
COMMUNITY

## 2024-03-27 RX ORDER — SERTRALINE HYDROCHLORIDE 25 MG/1
TABLET, FILM COATED ORAL
COMMUNITY

## 2024-03-27 RX ORDER — SENNOSIDES A AND B 8.6 MG/1
TABLET, FILM COATED ORAL
COMMUNITY

## 2024-03-27 RX ORDER — ONDANSETRON 4 MG/1
TABLET, ORALLY DISINTEGRATING ORAL
COMMUNITY
Start: 2023-12-06

## 2024-03-27 RX ORDER — FAMOTIDINE 40 MG/1
TABLET, FILM COATED ORAL
COMMUNITY

## 2024-03-27 RX ORDER — CHLORAL HYDRATE 500 MG
1 CAPSULE ORAL DAILY
COMMUNITY

## 2024-03-27 RX ORDER — PROCHLORPERAZINE MALEATE 10 MG
TABLET ORAL
COMMUNITY
Start: 2023-12-06

## 2024-03-27 RX ORDER — LEVOTHYROXINE SODIUM 0.1 MG/1
TABLET ORAL
COMMUNITY

## 2024-03-27 RX ORDER — SIMVASTATIN 20 MG
TABLET ORAL
COMMUNITY

## 2024-03-27 RX ORDER — RANITIDINE 300 MG/1
TABLET ORAL
COMMUNITY
End: 2024-03-27

## 2024-03-27 RX ORDER — ESOMEPRAZOLE MAGNESIUM 40 MG/1
CAPSULE, DELAYED RELEASE ORAL
COMMUNITY
End: 2024-03-27

## 2024-03-27 NOTE — PROGRESS NOTES
On Treatment Visit       Patient: Aislinn Ledesma   YOB: 1955   Medical Record Number: [id number]     Date of Visit  March 27, 2024   Primary Diagnosis:Malignant neoplasm of upper-inner quadrant of right breast in female, estrogen receptor positive [C50.211, Z17.0]  Cancer Staging: Cancer Staging   No matching staging information was found for the patient.         was seen today for an on treatment visit.  She is receiving radiation therapy to the right breast and axilla. She  has received 400 cGy in 2 fractions out of a planned dose of 5000 cGy in 25 fractions.    Today on exam the patient is tolerating radiation therapy well and has no new disease or treatment-related complaints.                                           Review of Systems:   Review of Systems   Constitutional:  Negative for appetite change and fatigue.   HENT:  Negative for sore throat, tinnitus and trouble swallowing.    Eyes:  Negative for visual disturbance.   Respiratory:  Negative for cough and shortness of breath.    Cardiovascular:  Negative for chest pain, palpitations and leg swelling.   Gastrointestinal:  Positive for constipation (ongoing / taking stool softners). Negative for abdominal pain, diarrhea, nausea and vomiting.   Genitourinary:  Negative for difficulty urinating, frequency and urgency.   Musculoskeletal:  Positive for arthralgias (ongoing) and back pain (ongoing).   Neurological:  Negative for dizziness, weakness and headaches.   Psychiatric/Behavioral:  Negative for sleep disturbance.        Vitals:   There were no vitals filed for this visit.    Weight:   Wt Readings from Last 3 Encounters:   03/15/24 55.3 kg (121 lb 14.6 oz)   07/25/19 59.4 kg (131 lb)   12/17/18 59.4 kg (131 lb)      Pain:  There were no vitals filed for this visit.      Physical Exam:  Gen: WD/WN; NAD  HEENT: MMM  Trachea: midline  Chest: symmetric  Resp: normal respiratory effort  Extr: warm, well-perfused  Neuro: awake and  alert; no aphasia or neglect    Plan: I have reviewed treatment setup notes, checked and approved the daily guidance images.  I reviewed dose delivery, treatment parameters and deemed them appropriate. We plan to continue radiation therapy as prescribed.    Once again discussed potential for boost to lumpectomy cavity.  Will monitor tolerance of radiotherapy with regard to dermatitis      Radiation Oncology    Electronically signed by Néstor Wilson MD 3/27/2024  15:49 EDT

## 2024-03-28 ENCOUNTER — HOSPITAL ENCOUNTER (OUTPATIENT)
Dept: RADIATION ONCOLOGY | Facility: HOSPITAL | Age: 69
Discharge: HOME | End: 2024-03-28

## 2024-03-28 LAB
RAD ONC ARIA COURSE ID: NORMAL
RAD ONC ARIA COURSE INTENT: NORMAL
RAD ONC ARIA COURSE LAST TREATMENT DATE: NORMAL
RAD ONC ARIA COURSE START DATE: NORMAL
RAD ONC ARIA COURSE TREATMENT ELAPSED DAYS: 2
RAD ONC ARIA FIRST TREATMENT DATE: NORMAL
RAD ONC ARIA PLAN FRACTIONS TREATED TO DATE: 3
RAD ONC ARIA PLAN FRACTIONS TREATED TO DATE: 3
RAD ONC ARIA PLAN ID: NORMAL
RAD ONC ARIA PLAN ID: NORMAL
RAD ONC ARIA PLAN PRESCRIBED DOSE PER FRACTION: 2 GY
RAD ONC ARIA PLAN PRESCRIBED DOSE PER FRACTION: 2 GY
RAD ONC ARIA PLAN PRIMARY REFERENCE POINT: NORMAL
RAD ONC ARIA PLAN PRIMARY REFERENCE POINT: NORMAL
RAD ONC ARIA PLAN TOTAL FRACTIONS PRESCRIBED: 25
RAD ONC ARIA PLAN TOTAL FRACTIONS PRESCRIBED: 25
RAD ONC ARIA PLAN TOTAL PRESCRIBED DOSE: 5000 CGY
RAD ONC ARIA PLAN TOTAL PRESCRIBED DOSE: 5000 CGY
RAD ONC ARIA REFERENCE POINT DOSAGE GIVEN TO DATE: 6 GY
RAD ONC ARIA REFERENCE POINT DOSAGE GIVEN TO DATE: 6 GY
RAD ONC ARIA REFERENCE POINT ID: NORMAL
RAD ONC ARIA REFERENCE POINT ID: NORMAL
RAD ONC ARIA REFERENCE POINT SESSION DOSAGE GIVEN: 2 GY
RAD ONC ARIA REFERENCE POINT SESSION DOSAGE GIVEN: 2 GY

## 2024-03-28 PROCEDURE — G6002 STEREOSCOPIC X-RAY GUIDANCE: HCPCS | Performed by: RADIOLOGY

## 2024-03-28 PROCEDURE — 77412 RADIATION TX DELIVERY LVL 3: CPT | Performed by: RADIOLOGY

## 2024-03-28 PROCEDURE — 77387 GUIDANCE FOR RADJ TX DLVR: CPT | Performed by: RADIOLOGY

## 2024-03-29 ENCOUNTER — HOSPITAL ENCOUNTER (OUTPATIENT)
Dept: RADIATION ONCOLOGY | Facility: HOSPITAL | Age: 69
Discharge: HOME | End: 2024-03-29

## 2024-03-29 LAB
RAD ONC ARIA COURSE ID: NORMAL
RAD ONC ARIA COURSE INTENT: NORMAL
RAD ONC ARIA COURSE LAST TREATMENT DATE: NORMAL
RAD ONC ARIA COURSE START DATE: NORMAL
RAD ONC ARIA COURSE TREATMENT ELAPSED DAYS: 3
RAD ONC ARIA FIRST TREATMENT DATE: NORMAL
RAD ONC ARIA PLAN FRACTIONS TREATED TO DATE: 4
RAD ONC ARIA PLAN FRACTIONS TREATED TO DATE: 4
RAD ONC ARIA PLAN ID: NORMAL
RAD ONC ARIA PLAN ID: NORMAL
RAD ONC ARIA PLAN PRESCRIBED DOSE PER FRACTION: 2 GY
RAD ONC ARIA PLAN PRESCRIBED DOSE PER FRACTION: 2 GY
RAD ONC ARIA PLAN PRIMARY REFERENCE POINT: NORMAL
RAD ONC ARIA PLAN PRIMARY REFERENCE POINT: NORMAL
RAD ONC ARIA PLAN TOTAL FRACTIONS PRESCRIBED: 25
RAD ONC ARIA PLAN TOTAL FRACTIONS PRESCRIBED: 25
RAD ONC ARIA PLAN TOTAL PRESCRIBED DOSE: 5000 CGY
RAD ONC ARIA PLAN TOTAL PRESCRIBED DOSE: 5000 CGY
RAD ONC ARIA REFERENCE POINT DOSAGE GIVEN TO DATE: 8 GY
RAD ONC ARIA REFERENCE POINT DOSAGE GIVEN TO DATE: 8 GY
RAD ONC ARIA REFERENCE POINT ID: NORMAL
RAD ONC ARIA REFERENCE POINT ID: NORMAL
RAD ONC ARIA REFERENCE POINT SESSION DOSAGE GIVEN: 2 GY
RAD ONC ARIA REFERENCE POINT SESSION DOSAGE GIVEN: 2 GY

## 2024-03-29 PROCEDURE — 77412 RADIATION TX DELIVERY LVL 3: CPT | Performed by: RADIOLOGY

## 2024-03-29 PROCEDURE — G6002 STEREOSCOPIC X-RAY GUIDANCE: HCPCS | Performed by: RADIOLOGY

## 2024-03-29 PROCEDURE — 77387 GUIDANCE FOR RADJ TX DLVR: CPT | Performed by: RADIOLOGY

## 2024-04-01 ENCOUNTER — HOSPITAL ENCOUNTER (OUTPATIENT)
Dept: RADIATION ONCOLOGY | Facility: HOSPITAL | Age: 69
Setting detail: RADIATION/ONCOLOGY SERIES
End: 2024-04-01
Payer: MEDICARE

## 2024-04-01 ENCOUNTER — HOSPITAL ENCOUNTER (OUTPATIENT)
Dept: RADIATION ONCOLOGY | Facility: HOSPITAL | Age: 69
Discharge: HOME | End: 2024-04-01
Payer: MEDICARE

## 2024-04-01 LAB
RAD ONC ARIA COURSE ID: NORMAL
RAD ONC ARIA COURSE INTENT: NORMAL
RAD ONC ARIA COURSE LAST TREATMENT DATE: NORMAL
RAD ONC ARIA COURSE START DATE: NORMAL
RAD ONC ARIA COURSE TREATMENT ELAPSED DAYS: 6
RAD ONC ARIA FIRST TREATMENT DATE: NORMAL
RAD ONC ARIA PLAN FRACTIONS TREATED TO DATE: 5
RAD ONC ARIA PLAN FRACTIONS TREATED TO DATE: 5
RAD ONC ARIA PLAN ID: NORMAL
RAD ONC ARIA PLAN ID: NORMAL
RAD ONC ARIA PLAN PRESCRIBED DOSE PER FRACTION: 2 GY
RAD ONC ARIA PLAN PRESCRIBED DOSE PER FRACTION: 2 GY
RAD ONC ARIA PLAN PRIMARY REFERENCE POINT: NORMAL
RAD ONC ARIA PLAN PRIMARY REFERENCE POINT: NORMAL
RAD ONC ARIA PLAN TOTAL FRACTIONS PRESCRIBED: 25
RAD ONC ARIA PLAN TOTAL FRACTIONS PRESCRIBED: 25
RAD ONC ARIA PLAN TOTAL PRESCRIBED DOSE: 5000 CGY
RAD ONC ARIA PLAN TOTAL PRESCRIBED DOSE: 5000 CGY
RAD ONC ARIA REFERENCE POINT DOSAGE GIVEN TO DATE: 10 GY
RAD ONC ARIA REFERENCE POINT DOSAGE GIVEN TO DATE: 10 GY
RAD ONC ARIA REFERENCE POINT ID: NORMAL
RAD ONC ARIA REFERENCE POINT ID: NORMAL
RAD ONC ARIA REFERENCE POINT SESSION DOSAGE GIVEN: 2 GY
RAD ONC ARIA REFERENCE POINT SESSION DOSAGE GIVEN: 2 GY

## 2024-04-01 PROCEDURE — 77412 RADIATION TX DELIVERY LVL 3: CPT | Performed by: RADIOLOGY

## 2024-04-01 PROCEDURE — 77387 GUIDANCE FOR RADJ TX DLVR: CPT | Performed by: RADIOLOGY

## 2024-04-01 PROCEDURE — G6002 STEREOSCOPIC X-RAY GUIDANCE: HCPCS | Performed by: RADIOLOGY

## 2024-04-01 PROCEDURE — 77336 RADIATION PHYSICS CONSULT: CPT | Performed by: RADIOLOGY

## 2024-04-02 ENCOUNTER — HOSPITAL ENCOUNTER (OUTPATIENT)
Dept: RADIATION ONCOLOGY | Facility: HOSPITAL | Age: 69
Discharge: HOME | End: 2024-04-02

## 2024-04-02 LAB
RAD ONC ARIA COURSE ID: NORMAL
RAD ONC ARIA COURSE INTENT: NORMAL
RAD ONC ARIA COURSE LAST TREATMENT DATE: NORMAL
RAD ONC ARIA COURSE START DATE: NORMAL
RAD ONC ARIA COURSE TREATMENT ELAPSED DAYS: 7
RAD ONC ARIA FIRST TREATMENT DATE: NORMAL
RAD ONC ARIA PLAN FRACTIONS TREATED TO DATE: 6
RAD ONC ARIA PLAN FRACTIONS TREATED TO DATE: 6
RAD ONC ARIA PLAN ID: NORMAL
RAD ONC ARIA PLAN ID: NORMAL
RAD ONC ARIA PLAN PRESCRIBED DOSE PER FRACTION: 2 GY
RAD ONC ARIA PLAN PRESCRIBED DOSE PER FRACTION: 2 GY
RAD ONC ARIA PLAN PRIMARY REFERENCE POINT: NORMAL
RAD ONC ARIA PLAN PRIMARY REFERENCE POINT: NORMAL
RAD ONC ARIA PLAN TOTAL FRACTIONS PRESCRIBED: 25
RAD ONC ARIA PLAN TOTAL FRACTIONS PRESCRIBED: 25
RAD ONC ARIA PLAN TOTAL PRESCRIBED DOSE: 5000 CGY
RAD ONC ARIA PLAN TOTAL PRESCRIBED DOSE: 5000 CGY
RAD ONC ARIA REFERENCE POINT DOSAGE GIVEN TO DATE: 12 GY
RAD ONC ARIA REFERENCE POINT DOSAGE GIVEN TO DATE: 12 GY
RAD ONC ARIA REFERENCE POINT ID: NORMAL
RAD ONC ARIA REFERENCE POINT ID: NORMAL
RAD ONC ARIA REFERENCE POINT SESSION DOSAGE GIVEN: 2 GY
RAD ONC ARIA REFERENCE POINT SESSION DOSAGE GIVEN: 2 GY

## 2024-04-02 PROCEDURE — 77427 RADIATION TX MANAGEMENT X5: CPT | Performed by: RADIOLOGY

## 2024-04-02 PROCEDURE — 77387 GUIDANCE FOR RADJ TX DLVR: CPT | Performed by: RADIOLOGY

## 2024-04-02 PROCEDURE — 77412 RADIATION TX DELIVERY LVL 3: CPT | Performed by: RADIOLOGY

## 2024-04-02 PROCEDURE — G6002 STEREOSCOPIC X-RAY GUIDANCE: HCPCS | Performed by: RADIOLOGY

## 2024-04-03 ENCOUNTER — HOSPITAL ENCOUNTER (OUTPATIENT)
Dept: RADIATION ONCOLOGY | Facility: HOSPITAL | Age: 69
Discharge: HOME | End: 2024-04-03

## 2024-04-03 VITALS
RESPIRATION RATE: 16 BRPM | BODY MASS INDEX: 20.14 KG/M2 | SYSTOLIC BLOOD PRESSURE: 123 MMHG | OXYGEN SATURATION: 99 % | WEIGHT: 121.03 LBS | HEART RATE: 59 BPM | TEMPERATURE: 97.7 F | DIASTOLIC BLOOD PRESSURE: 76 MMHG

## 2024-04-03 DIAGNOSIS — Z17.0 MALIGNANT NEOPLASM OF UPPER-INNER QUADRANT OF RIGHT BREAST IN FEMALE, ESTROGEN RECEPTOR POSITIVE: Primary | ICD-10-CM

## 2024-04-03 DIAGNOSIS — C50.211 MALIGNANT NEOPLASM OF UPPER-INNER QUADRANT OF RIGHT BREAST IN FEMALE, ESTROGEN RECEPTOR POSITIVE: Primary | ICD-10-CM

## 2024-04-03 LAB
RAD ONC ARIA COURSE ID: NORMAL
RAD ONC ARIA COURSE INTENT: NORMAL
RAD ONC ARIA COURSE LAST TREATMENT DATE: NORMAL
RAD ONC ARIA COURSE START DATE: NORMAL
RAD ONC ARIA COURSE TREATMENT ELAPSED DAYS: 8
RAD ONC ARIA FIRST TREATMENT DATE: NORMAL
RAD ONC ARIA PLAN FRACTIONS TREATED TO DATE: 7
RAD ONC ARIA PLAN FRACTIONS TREATED TO DATE: 7
RAD ONC ARIA PLAN ID: NORMAL
RAD ONC ARIA PLAN ID: NORMAL
RAD ONC ARIA PLAN PRESCRIBED DOSE PER FRACTION: 2 GY
RAD ONC ARIA PLAN PRESCRIBED DOSE PER FRACTION: 2 GY
RAD ONC ARIA PLAN PRIMARY REFERENCE POINT: NORMAL
RAD ONC ARIA PLAN PRIMARY REFERENCE POINT: NORMAL
RAD ONC ARIA PLAN TOTAL FRACTIONS PRESCRIBED: 25
RAD ONC ARIA PLAN TOTAL FRACTIONS PRESCRIBED: 25
RAD ONC ARIA PLAN TOTAL PRESCRIBED DOSE: 5000 CGY
RAD ONC ARIA PLAN TOTAL PRESCRIBED DOSE: 5000 CGY
RAD ONC ARIA REFERENCE POINT DOSAGE GIVEN TO DATE: 14 GY
RAD ONC ARIA REFERENCE POINT DOSAGE GIVEN TO DATE: 14 GY
RAD ONC ARIA REFERENCE POINT ID: NORMAL
RAD ONC ARIA REFERENCE POINT ID: NORMAL
RAD ONC ARIA REFERENCE POINT SESSION DOSAGE GIVEN: 2 GY
RAD ONC ARIA REFERENCE POINT SESSION DOSAGE GIVEN: 2 GY

## 2024-04-03 PROCEDURE — 77387 GUIDANCE FOR RADJ TX DLVR: CPT | Performed by: RADIOLOGY

## 2024-04-03 PROCEDURE — G6002 STEREOSCOPIC X-RAY GUIDANCE: HCPCS | Performed by: RADIOLOGY

## 2024-04-03 PROCEDURE — 77412 RADIATION TX DELIVERY LVL 3: CPT | Performed by: RADIOLOGY

## 2024-04-03 NOTE — PROGRESS NOTES
On Treatment Visit       Patient: Aislinn Ledesma   YOB: 1955   Medical Record Number: [id number]     Date of Visit  April 3, 2024   Primary Diagnosis:Malignant neoplasm of upper-inner quadrant of right breast in female, estrogen receptor positive [C50.211, Z17.0]  Cancer Staging: Cancer Staging   No matching staging information was found for the patient.         was seen today for an on treatment visit.  She is receiving radiation therapy to the right breast and axilla.  She  has received 1400 cGy in 7 fractions out of a planned dose of 5000 cGy in 25 fractions.     Today on exam the patient is tolerating radiation therapy well and has no new disease or treatment-related complaints.  She is using remedy creams and Aquaphor on the skin.                                            Review of Systems:   Review of Systems   Constitutional:  Positive for appetite change (wax and wanes) and fatigue. Negative for unexpected weight change.   HENT: Negative.          Dysgeusia      Respiratory:  Negative for cough and shortness of breath.    Gastrointestinal:  Positive for constipation (ongoing, takes stool softener with relief). Negative for anal bleeding, blood in stool, diarrhea, nausea and rectal pain.   Genitourinary:  Negative for difficulty urinating, dysuria, frequency and urgency.   Musculoskeletal:  Positive for arthralgias (right knee). Negative for back pain.   Skin:  Negative for color change and rash.   Neurological:  Negative for dizziness and headaches.   Psychiatric/Behavioral:  Positive for sleep disturbance (wakes throughout the night).        Vitals:     Vitals:    04/03/24 1405   BP: 123/76   Pulse: 59   Resp: 16   Temp: 97.7 °F (36.5 °C)   SpO2: 99%       Weight:   Wt Readings from Last 3 Encounters:   04/03/24 54.9 kg (121 lb 0.5 oz)   03/15/24 55.3 kg (121 lb 14.6 oz)   07/25/19 59.4 kg (131 lb)      Pain:    Pain Score    04/03/24 1405   PainSc: 0-No pain         Physical  Exam:  Physical Exam  Constitutional:       General: She is not in acute distress.  Pulmonary:      Effort: Pulmonary effort is normal.   Chest:      Comments: Faint erythema of the upper inner right chest.  Postlumpectomy changes in the lateral right breast.  Neurological:      Mental Status: She is alert.   Psychiatric:         Thought Content: Thought content normal.           Plan: I have reviewed treatment setup notes, checked and approved the daily guidance images.  I reviewed dose delivery, treatment parameters and deemed them appropriate. We plan to continue radiation therapy as prescribed.      Nayely Mayorga MD  Radiation Oncology   Electronically signed 4/3/2024  14:33 EDT

## 2024-04-04 ENCOUNTER — HOSPITAL ENCOUNTER (OUTPATIENT)
Dept: RADIATION ONCOLOGY | Facility: HOSPITAL | Age: 69
Discharge: HOME | End: 2024-04-04

## 2024-04-04 LAB
RAD ONC ARIA COURSE ID: NORMAL
RAD ONC ARIA COURSE INTENT: NORMAL
RAD ONC ARIA COURSE LAST TREATMENT DATE: NORMAL
RAD ONC ARIA COURSE START DATE: NORMAL
RAD ONC ARIA COURSE TREATMENT ELAPSED DAYS: 9
RAD ONC ARIA FIRST TREATMENT DATE: NORMAL
RAD ONC ARIA PLAN FRACTIONS TREATED TO DATE: 8
RAD ONC ARIA PLAN FRACTIONS TREATED TO DATE: 8
RAD ONC ARIA PLAN ID: NORMAL
RAD ONC ARIA PLAN ID: NORMAL
RAD ONC ARIA PLAN PRESCRIBED DOSE PER FRACTION: 2 GY
RAD ONC ARIA PLAN PRESCRIBED DOSE PER FRACTION: 2 GY
RAD ONC ARIA PLAN PRIMARY REFERENCE POINT: NORMAL
RAD ONC ARIA PLAN PRIMARY REFERENCE POINT: NORMAL
RAD ONC ARIA PLAN TOTAL FRACTIONS PRESCRIBED: 25
RAD ONC ARIA PLAN TOTAL FRACTIONS PRESCRIBED: 25
RAD ONC ARIA PLAN TOTAL PRESCRIBED DOSE: 5000 CGY
RAD ONC ARIA PLAN TOTAL PRESCRIBED DOSE: 5000 CGY
RAD ONC ARIA REFERENCE POINT DOSAGE GIVEN TO DATE: 16 GY
RAD ONC ARIA REFERENCE POINT DOSAGE GIVEN TO DATE: 16 GY
RAD ONC ARIA REFERENCE POINT ID: NORMAL
RAD ONC ARIA REFERENCE POINT ID: NORMAL
RAD ONC ARIA REFERENCE POINT SESSION DOSAGE GIVEN: 2 GY
RAD ONC ARIA REFERENCE POINT SESSION DOSAGE GIVEN: 2 GY

## 2024-04-04 PROCEDURE — 77412 RADIATION TX DELIVERY LVL 3: CPT | Performed by: NURSE PRACTITIONER

## 2024-04-05 ENCOUNTER — HOSPITAL ENCOUNTER (OUTPATIENT)
Dept: RADIATION ONCOLOGY | Facility: HOSPITAL | Age: 69
Discharge: HOME | End: 2024-04-05

## 2024-04-05 LAB
RAD ONC ARIA COURSE ID: NORMAL
RAD ONC ARIA COURSE INTENT: NORMAL
RAD ONC ARIA COURSE LAST TREATMENT DATE: NORMAL
RAD ONC ARIA COURSE START DATE: NORMAL
RAD ONC ARIA COURSE TREATMENT ELAPSED DAYS: 10
RAD ONC ARIA FIRST TREATMENT DATE: NORMAL
RAD ONC ARIA PLAN FRACTIONS TREATED TO DATE: 9
RAD ONC ARIA PLAN FRACTIONS TREATED TO DATE: 9
RAD ONC ARIA PLAN ID: NORMAL
RAD ONC ARIA PLAN ID: NORMAL
RAD ONC ARIA PLAN PRESCRIBED DOSE PER FRACTION: 2 GY
RAD ONC ARIA PLAN PRESCRIBED DOSE PER FRACTION: 2 GY
RAD ONC ARIA PLAN PRIMARY REFERENCE POINT: NORMAL
RAD ONC ARIA PLAN PRIMARY REFERENCE POINT: NORMAL
RAD ONC ARIA PLAN TOTAL FRACTIONS PRESCRIBED: 25
RAD ONC ARIA PLAN TOTAL FRACTIONS PRESCRIBED: 25
RAD ONC ARIA PLAN TOTAL PRESCRIBED DOSE: 5000 CGY
RAD ONC ARIA PLAN TOTAL PRESCRIBED DOSE: 5000 CGY
RAD ONC ARIA REFERENCE POINT DOSAGE GIVEN TO DATE: 18 GY
RAD ONC ARIA REFERENCE POINT DOSAGE GIVEN TO DATE: 18 GY
RAD ONC ARIA REFERENCE POINT ID: NORMAL
RAD ONC ARIA REFERENCE POINT ID: NORMAL
RAD ONC ARIA REFERENCE POINT SESSION DOSAGE GIVEN: 2 GY
RAD ONC ARIA REFERENCE POINT SESSION DOSAGE GIVEN: 2 GY

## 2024-04-05 PROCEDURE — 77387 GUIDANCE FOR RADJ TX DLVR: CPT | Performed by: RADIOLOGY

## 2024-04-05 PROCEDURE — G6002 STEREOSCOPIC X-RAY GUIDANCE: HCPCS | Performed by: RADIOLOGY

## 2024-04-05 PROCEDURE — 77412 RADIATION TX DELIVERY LVL 3: CPT | Performed by: RADIOLOGY

## 2024-04-08 ENCOUNTER — HOSPITAL ENCOUNTER (OUTPATIENT)
Dept: RADIATION ONCOLOGY | Facility: HOSPITAL | Age: 69
Discharge: HOME | End: 2024-04-08
Payer: MEDICARE

## 2024-04-08 ENCOUNTER — DOCUMENTATION (OUTPATIENT)
Dept: RADIATION ONCOLOGY | Facility: HOSPITAL | Age: 69
End: 2024-04-08
Payer: MEDICARE

## 2024-04-08 LAB
RAD ONC ARIA COURSE ID: NORMAL
RAD ONC ARIA COURSE INTENT: NORMAL
RAD ONC ARIA COURSE LAST TREATMENT DATE: NORMAL
RAD ONC ARIA COURSE START DATE: NORMAL
RAD ONC ARIA COURSE TREATMENT ELAPSED DAYS: 13
RAD ONC ARIA FIRST TREATMENT DATE: NORMAL
RAD ONC ARIA PLAN FRACTIONS TREATED TO DATE: 10
RAD ONC ARIA PLAN FRACTIONS TREATED TO DATE: 10
RAD ONC ARIA PLAN ID: NORMAL
RAD ONC ARIA PLAN ID: NORMAL
RAD ONC ARIA PLAN PRESCRIBED DOSE PER FRACTION: 2 GY
RAD ONC ARIA PLAN PRESCRIBED DOSE PER FRACTION: 2 GY
RAD ONC ARIA PLAN PRIMARY REFERENCE POINT: NORMAL
RAD ONC ARIA PLAN PRIMARY REFERENCE POINT: NORMAL
RAD ONC ARIA PLAN TOTAL FRACTIONS PRESCRIBED: 25
RAD ONC ARIA PLAN TOTAL FRACTIONS PRESCRIBED: 25
RAD ONC ARIA PLAN TOTAL PRESCRIBED DOSE: 5000 CGY
RAD ONC ARIA PLAN TOTAL PRESCRIBED DOSE: 5000 CGY
RAD ONC ARIA REFERENCE POINT DOSAGE GIVEN TO DATE: 20 GY
RAD ONC ARIA REFERENCE POINT DOSAGE GIVEN TO DATE: 20 GY
RAD ONC ARIA REFERENCE POINT ID: NORMAL
RAD ONC ARIA REFERENCE POINT ID: NORMAL
RAD ONC ARIA REFERENCE POINT SESSION DOSAGE GIVEN: 2 GY
RAD ONC ARIA REFERENCE POINT SESSION DOSAGE GIVEN: 2 GY

## 2024-04-08 PROCEDURE — G6002 STEREOSCOPIC X-RAY GUIDANCE: HCPCS | Performed by: RADIOLOGY

## 2024-04-08 PROCEDURE — 77412 RADIATION TX DELIVERY LVL 3: CPT | Performed by: RADIOLOGY

## 2024-04-08 PROCEDURE — 77336 RADIATION PHYSICS CONSULT: CPT | Performed by: RADIOLOGY

## 2024-04-08 PROCEDURE — 77387 GUIDANCE FOR RADJ TX DLVR: CPT | Performed by: RADIOLOGY

## 2024-04-08 NOTE — PROGRESS NOTES
Distress Screening Follow-Up    Date of Distress Screening: 3/15/24    Location of Distress Screening: Radiation oncology    Distress Level: 5    Problems Indicated: Fatigue    Diagnosis: Breast cancer    Current Tx Plan: Mrs. Ledesma underwent a right breast lumpectomy followed by adjuvant chemotherapy. She is now receiving 25 fractions of external beam radiotherapy.    Most Recent PHQ -2/PHQ-9 Score: 0 (3/15/24)    Most Recent C-SSRS: Denied SI and screened negative (3/15/24)    Mental Status: Mrs. Ledesma presented in a very pleasant mood and was openly engaged in conversation. She did not present in high distress. She has already completed 10 fractions of her 25 prescribed. Advised that [facility] can assist in getting her connected to emotional support services, including oncology peer support, behavioral health for counseling or medication management, etc. She expressed appreciation knowing that [facility]W support is available to help address any barriers to care or unmet needs. She reports similar support was offered to her at [facility] while undergoing chemotherapy treatments there. She reports having a great support system and did not identify any resources or support services needed at this time.    Mental Health Treatment: Mrs. Ledesma is prescribed Zoloft daily and Klonopin PRN for anxiety and depression.     Substance Use/Tobacco Use: Mrs. Ledesma is a former cigarette smoker, quitting roughly 37 years ago. She has no h/o alcohol or illicit drug use.     Support System: Mrs. Ledesma receives positive support from her spouse, daughter and son, etc. She reports having a good support system.    Spirituality: Mrs. Ledesma is of the Hinduism [place].    Residential status/Who lives in the home: Mrs. Ledesma resides with her spouse in their home in [facility].     Home Care Needs: No needs identified at this time. Mrs. Ledesma is independent with her ADLs - ECOG score of 0.    Insurance: Medicare and      Finances: Mrs. Ledesma denies any financial concerns at this time. Her household is able to sufficiently meet their basic needs. She reports being blessed with good health insurance coverage.    Transportation: Mrs. Ledesma denies any transportation concerns, including ability to afford fuel expenses to daily treatments from her residence in [facility].    Advance Care Planning: Living Will directive on file     Resources/Referrals: No needs identified at this time    Additional Comment: OSW met with Mrs. Ledesma in radiation oncology to follow-up in regards to her high distress, introduce OSW role and assess for psychosocial needs. OSW educated on the support services that can be accessed to address physical, emotional, social, practical and spiritual needs. Mrs. Ledesma respectfully declined any resources or referrals at this time. She did not identify any barriers to care or unmet needs. Provided her with my business card, encouraging OSW support remains available. She expressed appreciation to know these support services are available if needed, especially for individuals who may not have a good support system or the financial means to receive care.

## 2024-04-09 ENCOUNTER — HOSPITAL ENCOUNTER (OUTPATIENT)
Dept: RADIATION ONCOLOGY | Facility: HOSPITAL | Age: 69
Discharge: HOME | End: 2024-04-09

## 2024-04-09 VITALS
SYSTOLIC BLOOD PRESSURE: 106 MMHG | TEMPERATURE: 97.7 F | HEART RATE: 63 BPM | OXYGEN SATURATION: 100 % | DIASTOLIC BLOOD PRESSURE: 66 MMHG | WEIGHT: 122.14 LBS | RESPIRATION RATE: 20 BRPM | BODY MASS INDEX: 20.32 KG/M2

## 2024-04-09 DIAGNOSIS — Z17.0 MALIGNANT NEOPLASM OF UPPER-INNER QUADRANT OF RIGHT BREAST IN FEMALE, ESTROGEN RECEPTOR POSITIVE: Primary | ICD-10-CM

## 2024-04-09 DIAGNOSIS — C50.211 MALIGNANT NEOPLASM OF UPPER-INNER QUADRANT OF RIGHT BREAST IN FEMALE, ESTROGEN RECEPTOR POSITIVE: Primary | ICD-10-CM

## 2024-04-09 LAB
RAD ONC ARIA COURSE ID: NORMAL
RAD ONC ARIA COURSE INTENT: NORMAL
RAD ONC ARIA COURSE LAST TREATMENT DATE: NORMAL
RAD ONC ARIA COURSE START DATE: NORMAL
RAD ONC ARIA COURSE TREATMENT ELAPSED DAYS: 14
RAD ONC ARIA FIRST TREATMENT DATE: NORMAL
RAD ONC ARIA PLAN FRACTIONS TREATED TO DATE: 11
RAD ONC ARIA PLAN FRACTIONS TREATED TO DATE: 11
RAD ONC ARIA PLAN ID: NORMAL
RAD ONC ARIA PLAN ID: NORMAL
RAD ONC ARIA PLAN PRESCRIBED DOSE PER FRACTION: 2 GY
RAD ONC ARIA PLAN PRESCRIBED DOSE PER FRACTION: 2 GY
RAD ONC ARIA PLAN PRIMARY REFERENCE POINT: NORMAL
RAD ONC ARIA PLAN PRIMARY REFERENCE POINT: NORMAL
RAD ONC ARIA PLAN TOTAL FRACTIONS PRESCRIBED: 25
RAD ONC ARIA PLAN TOTAL FRACTIONS PRESCRIBED: 25
RAD ONC ARIA PLAN TOTAL PRESCRIBED DOSE: 5000 CGY
RAD ONC ARIA PLAN TOTAL PRESCRIBED DOSE: 5000 CGY
RAD ONC ARIA REFERENCE POINT DOSAGE GIVEN TO DATE: 22 GY
RAD ONC ARIA REFERENCE POINT DOSAGE GIVEN TO DATE: 22 GY
RAD ONC ARIA REFERENCE POINT ID: NORMAL
RAD ONC ARIA REFERENCE POINT ID: NORMAL
RAD ONC ARIA REFERENCE POINT SESSION DOSAGE GIVEN: 2 GY
RAD ONC ARIA REFERENCE POINT SESSION DOSAGE GIVEN: 2 GY

## 2024-04-09 PROCEDURE — G6002 STEREOSCOPIC X-RAY GUIDANCE: HCPCS | Performed by: RADIOLOGY

## 2024-04-09 PROCEDURE — 77412 RADIATION TX DELIVERY LVL 3: CPT | Performed by: RADIOLOGY

## 2024-04-09 PROCEDURE — 77387 GUIDANCE FOR RADJ TX DLVR: CPT | Performed by: RADIOLOGY

## 2024-04-09 PROCEDURE — 77427 RADIATION TX MANAGEMENT X5: CPT | Performed by: RADIOLOGY

## 2024-04-09 NOTE — PROGRESS NOTES
On Treatment Visit       Patient: Aislinn Ledesma   YOB: 1955   Medical Record Number: [id number]     Date of Visit  April 9, 2024   Primary Diagnosis:Malignant neoplasm of upper-inner quadrant of right breast in female, estrogen receptor positive [C50.211, Z17.0]  Cancer Staging: Cancer Staging   No matching staging information was found for the patient.         was seen today for an on treatment visit.  She is receiving radiation therapy to the right breast and axilla. She  has received 2200 cGy in 11 fractions out of a planned dose of 5000 cGy in 25 fractions.    Today on exam the patient is tolerating radiation therapy well and has no new disease or treatment-related complaints.  Slight right breast redness.                                             Review of Systems:   Review of Systems   Constitutional:  Negative for appetite change, fatigue and unexpected weight change.   HENT:  Positive for sinus pressure (Noted today). Negative for sore throat, tinnitus and trouble swallowing.    Eyes:  Negative for visual disturbance.   Respiratory:  Positive for cough (Occasional dry cough, ongoing). Negative for chest tightness, shortness of breath and wheezing.    Cardiovascular:  Negative for chest pain, palpitations and leg swelling.   Gastrointestinal:  Positive for constipation (ongoing / taking stool softners and laxatives prn.). Negative for abdominal distention, abdominal pain, anal bleeding, blood in stool, diarrhea, nausea and vomiting.   Genitourinary:  Negative for difficulty urinating, dysuria, frequency, hematuria and urgency.   Musculoskeletal:  Positive for arthralgias (ongoing) and back pain (ongoing). Negative for gait problem and joint swelling.   Skin:  Negative for color change and rash.   Neurological:  Negative for dizziness, weakness and headaches.   Psychiatric/Behavioral:  Positive for sleep disturbance (Wakes throughout the night, ongoing for last couple of months.).         Vitals:     Vitals:    04/09/24 1405   BP: 106/66   Pulse: 63   Resp: 20   Temp: 97.7 °F (36.5 °C)   SpO2: 100%       Weight:   Wt Readings from Last 3 Encounters:   04/09/24 55.4 kg (122 lb 2.2 oz)   04/03/24 54.9 kg (121 lb 0.5 oz)   03/15/24 55.3 kg (121 lb 14.6 oz)      Pain:    Pain Score    04/09/24 1405   PainSc: 0-No pain         Physical Exam:  Gen: WD/WN; NAD  HEENT: MMM  Trachea: midline  Chest: symmetric  Resp: normal respiratory effort  Extr: warm, well-perfused  Neuro: awake and alert; no aphasia or neglect    Plan: I have reviewed treatment setup notes, checked and approved the daily guidance images.  I reviewed dose delivery, treatment parameters and deemed them appropriate. We plan to continue radiation therapy as prescribed.      Radiation Oncology    Electronically signed by Néstor Wilson MD 4/9/2024  14:30 EDT

## 2024-04-10 ENCOUNTER — HOSPITAL ENCOUNTER (OUTPATIENT)
Dept: RADIATION ONCOLOGY | Facility: HOSPITAL | Age: 69
Discharge: HOME | End: 2024-04-10

## 2024-04-10 LAB
RAD ONC ARIA COURSE ID: NORMAL
RAD ONC ARIA COURSE INTENT: NORMAL
RAD ONC ARIA COURSE LAST TREATMENT DATE: NORMAL
RAD ONC ARIA COURSE START DATE: NORMAL
RAD ONC ARIA COURSE TREATMENT ELAPSED DAYS: 15
RAD ONC ARIA FIRST TREATMENT DATE: NORMAL
RAD ONC ARIA PLAN FRACTIONS TREATED TO DATE: 12
RAD ONC ARIA PLAN FRACTIONS TREATED TO DATE: 12
RAD ONC ARIA PLAN ID: NORMAL
RAD ONC ARIA PLAN ID: NORMAL
RAD ONC ARIA PLAN PRESCRIBED DOSE PER FRACTION: 2 GY
RAD ONC ARIA PLAN PRESCRIBED DOSE PER FRACTION: 2 GY
RAD ONC ARIA PLAN PRIMARY REFERENCE POINT: NORMAL
RAD ONC ARIA PLAN PRIMARY REFERENCE POINT: NORMAL
RAD ONC ARIA PLAN TOTAL FRACTIONS PRESCRIBED: 25
RAD ONC ARIA PLAN TOTAL FRACTIONS PRESCRIBED: 25
RAD ONC ARIA PLAN TOTAL PRESCRIBED DOSE: 5000 CGY
RAD ONC ARIA PLAN TOTAL PRESCRIBED DOSE: 5000 CGY
RAD ONC ARIA REFERENCE POINT DOSAGE GIVEN TO DATE: 24 GY
RAD ONC ARIA REFERENCE POINT DOSAGE GIVEN TO DATE: 24 GY
RAD ONC ARIA REFERENCE POINT ID: NORMAL
RAD ONC ARIA REFERENCE POINT ID: NORMAL
RAD ONC ARIA REFERENCE POINT SESSION DOSAGE GIVEN: 2 GY
RAD ONC ARIA REFERENCE POINT SESSION DOSAGE GIVEN: 2 GY

## 2024-04-10 PROCEDURE — G6002 STEREOSCOPIC X-RAY GUIDANCE: HCPCS | Performed by: RADIOLOGY

## 2024-04-10 PROCEDURE — 77412 RADIATION TX DELIVERY LVL 3: CPT | Performed by: RADIOLOGY

## 2024-04-10 PROCEDURE — 77387 GUIDANCE FOR RADJ TX DLVR: CPT | Performed by: RADIOLOGY

## 2024-04-11 ENCOUNTER — HOSPITAL ENCOUNTER (OUTPATIENT)
Dept: RADIATION ONCOLOGY | Facility: HOSPITAL | Age: 69
Discharge: HOME | End: 2024-04-11

## 2024-04-11 LAB
RAD ONC ARIA COURSE ID: NORMAL
RAD ONC ARIA COURSE INTENT: NORMAL
RAD ONC ARIA COURSE LAST TREATMENT DATE: NORMAL
RAD ONC ARIA COURSE START DATE: NORMAL
RAD ONC ARIA COURSE TREATMENT ELAPSED DAYS: 16
RAD ONC ARIA FIRST TREATMENT DATE: NORMAL
RAD ONC ARIA PLAN FRACTIONS TREATED TO DATE: 13
RAD ONC ARIA PLAN FRACTIONS TREATED TO DATE: 13
RAD ONC ARIA PLAN ID: NORMAL
RAD ONC ARIA PLAN ID: NORMAL
RAD ONC ARIA PLAN PRESCRIBED DOSE PER FRACTION: 2 GY
RAD ONC ARIA PLAN PRESCRIBED DOSE PER FRACTION: 2 GY
RAD ONC ARIA PLAN PRIMARY REFERENCE POINT: NORMAL
RAD ONC ARIA PLAN PRIMARY REFERENCE POINT: NORMAL
RAD ONC ARIA PLAN TOTAL FRACTIONS PRESCRIBED: 25
RAD ONC ARIA PLAN TOTAL FRACTIONS PRESCRIBED: 25
RAD ONC ARIA PLAN TOTAL PRESCRIBED DOSE: 5000 CGY
RAD ONC ARIA PLAN TOTAL PRESCRIBED DOSE: 5000 CGY
RAD ONC ARIA REFERENCE POINT DOSAGE GIVEN TO DATE: 26 GY
RAD ONC ARIA REFERENCE POINT DOSAGE GIVEN TO DATE: 26 GY
RAD ONC ARIA REFERENCE POINT ID: NORMAL
RAD ONC ARIA REFERENCE POINT ID: NORMAL
RAD ONC ARIA REFERENCE POINT SESSION DOSAGE GIVEN: 2 GY
RAD ONC ARIA REFERENCE POINT SESSION DOSAGE GIVEN: 2 GY

## 2024-04-11 PROCEDURE — 77412 RADIATION TX DELIVERY LVL 3: CPT | Performed by: RADIOLOGY

## 2024-04-11 PROCEDURE — 77387 GUIDANCE FOR RADJ TX DLVR: CPT | Performed by: RADIOLOGY

## 2024-04-11 PROCEDURE — G6002 STEREOSCOPIC X-RAY GUIDANCE: HCPCS | Performed by: RADIOLOGY

## 2024-04-12 ENCOUNTER — HOSPITAL ENCOUNTER (OUTPATIENT)
Dept: RADIATION ONCOLOGY | Facility: HOSPITAL | Age: 69
Discharge: HOME | End: 2024-04-12

## 2024-04-12 LAB
RAD ONC ARIA COURSE ID: NORMAL
RAD ONC ARIA COURSE INTENT: NORMAL
RAD ONC ARIA COURSE LAST TREATMENT DATE: NORMAL
RAD ONC ARIA COURSE START DATE: NORMAL
RAD ONC ARIA COURSE TREATMENT ELAPSED DAYS: 17
RAD ONC ARIA FIRST TREATMENT DATE: NORMAL
RAD ONC ARIA PLAN FRACTIONS TREATED TO DATE: 14
RAD ONC ARIA PLAN FRACTIONS TREATED TO DATE: 14
RAD ONC ARIA PLAN ID: NORMAL
RAD ONC ARIA PLAN ID: NORMAL
RAD ONC ARIA PLAN PRESCRIBED DOSE PER FRACTION: 2 GY
RAD ONC ARIA PLAN PRESCRIBED DOSE PER FRACTION: 2 GY
RAD ONC ARIA PLAN PRIMARY REFERENCE POINT: NORMAL
RAD ONC ARIA PLAN PRIMARY REFERENCE POINT: NORMAL
RAD ONC ARIA PLAN TOTAL FRACTIONS PRESCRIBED: 25
RAD ONC ARIA PLAN TOTAL FRACTIONS PRESCRIBED: 25
RAD ONC ARIA PLAN TOTAL PRESCRIBED DOSE: 5000 CGY
RAD ONC ARIA PLAN TOTAL PRESCRIBED DOSE: 5000 CGY
RAD ONC ARIA REFERENCE POINT DOSAGE GIVEN TO DATE: 28 GY
RAD ONC ARIA REFERENCE POINT DOSAGE GIVEN TO DATE: 28 GY
RAD ONC ARIA REFERENCE POINT ID: NORMAL
RAD ONC ARIA REFERENCE POINT ID: NORMAL
RAD ONC ARIA REFERENCE POINT SESSION DOSAGE GIVEN: 2 GY
RAD ONC ARIA REFERENCE POINT SESSION DOSAGE GIVEN: 2 GY

## 2024-04-12 PROCEDURE — G6002 STEREOSCOPIC X-RAY GUIDANCE: HCPCS | Performed by: RADIOLOGY

## 2024-04-12 PROCEDURE — 77387 GUIDANCE FOR RADJ TX DLVR: CPT | Performed by: RADIOLOGY

## 2024-04-12 PROCEDURE — 77412 RADIATION TX DELIVERY LVL 3: CPT | Performed by: RADIOLOGY

## 2024-04-15 ENCOUNTER — PATIENT OUTREACH (OUTPATIENT)
Dept: ONCOLOGY | Facility: HOSPITAL | Age: 69
End: 2024-04-15
Payer: MEDICARE

## 2024-04-15 ENCOUNTER — HOSPITAL ENCOUNTER (OUTPATIENT)
Dept: RADIATION ONCOLOGY | Facility: HOSPITAL | Age: 69
Discharge: HOME | End: 2024-04-15
Payer: MEDICARE

## 2024-04-15 LAB
RAD ONC ARIA COURSE ID: NORMAL
RAD ONC ARIA COURSE INTENT: NORMAL
RAD ONC ARIA COURSE LAST TREATMENT DATE: NORMAL
RAD ONC ARIA COURSE START DATE: NORMAL
RAD ONC ARIA COURSE TREATMENT ELAPSED DAYS: 20
RAD ONC ARIA FIRST TREATMENT DATE: NORMAL
RAD ONC ARIA PLAN FRACTIONS TREATED TO DATE: 15
RAD ONC ARIA PLAN FRACTIONS TREATED TO DATE: 15
RAD ONC ARIA PLAN ID: NORMAL
RAD ONC ARIA PLAN ID: NORMAL
RAD ONC ARIA PLAN PRESCRIBED DOSE PER FRACTION: 2 GY
RAD ONC ARIA PLAN PRESCRIBED DOSE PER FRACTION: 2 GY
RAD ONC ARIA PLAN PRIMARY REFERENCE POINT: NORMAL
RAD ONC ARIA PLAN PRIMARY REFERENCE POINT: NORMAL
RAD ONC ARIA PLAN TOTAL FRACTIONS PRESCRIBED: 25
RAD ONC ARIA PLAN TOTAL FRACTIONS PRESCRIBED: 25
RAD ONC ARIA PLAN TOTAL PRESCRIBED DOSE: 5000 CGY
RAD ONC ARIA PLAN TOTAL PRESCRIBED DOSE: 5000 CGY
RAD ONC ARIA REFERENCE POINT DOSAGE GIVEN TO DATE: 30 GY
RAD ONC ARIA REFERENCE POINT DOSAGE GIVEN TO DATE: 30 GY
RAD ONC ARIA REFERENCE POINT ID: NORMAL
RAD ONC ARIA REFERENCE POINT ID: NORMAL
RAD ONC ARIA REFERENCE POINT SESSION DOSAGE GIVEN: 2 GY
RAD ONC ARIA REFERENCE POINT SESSION DOSAGE GIVEN: 2 GY

## 2024-04-15 PROCEDURE — G6002 STEREOSCOPIC X-RAY GUIDANCE: HCPCS | Performed by: RADIOLOGY

## 2024-04-15 PROCEDURE — 77387 GUIDANCE FOR RADJ TX DLVR: CPT | Performed by: RADIOLOGY

## 2024-04-15 PROCEDURE — 77336 RADIATION PHYSICS CONSULT: CPT | Performed by: RADIOLOGY

## 2024-04-15 PROCEDURE — 77412 RADIATION TX DELIVERY LVL 3: CPT | Performed by: RADIOLOGY

## 2024-04-16 ENCOUNTER — HOSPITAL ENCOUNTER (OUTPATIENT)
Dept: RADIATION ONCOLOGY | Facility: HOSPITAL | Age: 69
Discharge: HOME | End: 2024-04-16

## 2024-04-16 ENCOUNTER — PATIENT OUTREACH (OUTPATIENT)
Dept: ONCOLOGY | Facility: HOSPITAL | Age: 69
End: 2024-04-16
Payer: MEDICARE

## 2024-04-16 VITALS
TEMPERATURE: 97.7 F | HEART RATE: 69 BPM | DIASTOLIC BLOOD PRESSURE: 73 MMHG | SYSTOLIC BLOOD PRESSURE: 124 MMHG | RESPIRATION RATE: 16 BRPM | WEIGHT: 121.91 LBS | OXYGEN SATURATION: 99 % | BODY MASS INDEX: 20.29 KG/M2

## 2024-04-16 DIAGNOSIS — Z17.0 MALIGNANT NEOPLASM OF UPPER-INNER QUADRANT OF RIGHT BREAST IN FEMALE, ESTROGEN RECEPTOR POSITIVE: Primary | ICD-10-CM

## 2024-04-16 DIAGNOSIS — C50.211 MALIGNANT NEOPLASM OF UPPER-INNER QUADRANT OF RIGHT BREAST IN FEMALE, ESTROGEN RECEPTOR POSITIVE: Primary | ICD-10-CM

## 2024-04-16 LAB
RAD ONC ARIA COURSE ID: NORMAL
RAD ONC ARIA COURSE INTENT: NORMAL
RAD ONC ARIA COURSE LAST TREATMENT DATE: NORMAL
RAD ONC ARIA COURSE START DATE: NORMAL
RAD ONC ARIA COURSE TREATMENT ELAPSED DAYS: 21
RAD ONC ARIA FIRST TREATMENT DATE: NORMAL
RAD ONC ARIA PLAN FRACTIONS TREATED TO DATE: 16
RAD ONC ARIA PLAN FRACTIONS TREATED TO DATE: 16
RAD ONC ARIA PLAN ID: NORMAL
RAD ONC ARIA PLAN ID: NORMAL
RAD ONC ARIA PLAN PRESCRIBED DOSE PER FRACTION: 2 GY
RAD ONC ARIA PLAN PRESCRIBED DOSE PER FRACTION: 2 GY
RAD ONC ARIA PLAN PRIMARY REFERENCE POINT: NORMAL
RAD ONC ARIA PLAN PRIMARY REFERENCE POINT: NORMAL
RAD ONC ARIA PLAN TOTAL FRACTIONS PRESCRIBED: 25
RAD ONC ARIA PLAN TOTAL FRACTIONS PRESCRIBED: 25
RAD ONC ARIA PLAN TOTAL PRESCRIBED DOSE: 5000 CGY
RAD ONC ARIA PLAN TOTAL PRESCRIBED DOSE: 5000 CGY
RAD ONC ARIA REFERENCE POINT DOSAGE GIVEN TO DATE: 32 GY
RAD ONC ARIA REFERENCE POINT DOSAGE GIVEN TO DATE: 32 GY
RAD ONC ARIA REFERENCE POINT ID: NORMAL
RAD ONC ARIA REFERENCE POINT ID: NORMAL
RAD ONC ARIA REFERENCE POINT SESSION DOSAGE GIVEN: 2 GY
RAD ONC ARIA REFERENCE POINT SESSION DOSAGE GIVEN: 2 GY

## 2024-04-16 PROCEDURE — 77412 RADIATION TX DELIVERY LVL 3: CPT | Performed by: RADIOLOGY

## 2024-04-16 PROCEDURE — G6002 STEREOSCOPIC X-RAY GUIDANCE: HCPCS | Performed by: RADIOLOGY

## 2024-04-16 PROCEDURE — 77387 GUIDANCE FOR RADJ TX DLVR: CPT | Performed by: RADIOLOGY

## 2024-04-16 NOTE — PROGRESS NOTES
On Treatment Visit       Patient: Aislinn Ledesma   YOB: 1955   Medical Record Number: [id number]     Date of Visit  April 16, 2024   Primary Diagnosis:Malignant neoplasm of upper-inner quadrant of right breast in female, estrogen receptor positive [C50.211, Z17.0]         was seen today for an on treatment visit.  She is receiving radiation therapy to the right breast and axilla. She  has received 3200 cGy in 16 fractions out of a planned dose of 5000 cGy in 25 fractions.    No significant changes since last evaluated.                                             Review of Systems:   Review of Systems   Constitutional:  Positive for fatigue (2/10). Negative for appetite change and unexpected weight change.   HENT:  Negative for sinus pressure, sore throat, tinnitus and trouble swallowing.    Eyes:  Negative for visual disturbance.   Respiratory:  Positive for cough (Occasional dry cough, ongoing). Negative for chest tightness, shortness of breath and wheezing.    Cardiovascular:  Negative for chest pain, palpitations and leg swelling.   Gastrointestinal:  Positive for constipation (ongoing / taking stool softners and laxatives prn.). Negative for abdominal distention, abdominal pain, anal bleeding, blood in stool, diarrhea, nausea and vomiting.   Genitourinary:  Positive for urgency. Negative for difficulty urinating, dysuria, frequency and hematuria.   Musculoskeletal:  Positive for arthralgias (ongoing) and back pain (ongoing). Negative for gait problem and joint swelling.   Skin:  Negative for color change and rash.   Neurological:  Negative for dizziness, weakness and headaches.   Psychiatric/Behavioral:  Positive for sleep disturbance (Wakes throughout the night, ongoing for last couple of months.).        Vitals:     Vitals:    04/16/24 1404   BP: 124/73   Pulse: 69   Resp: 16   Temp: 97.7 °F (36.5 °C)   SpO2: 99%       Weight:   Wt Readings from Last 3 Encounters:   04/16/24 55.3 kg  (121 lb 14.6 oz)   04/09/24 55.4 kg (122 lb 2.2 oz)   04/03/24 54.9 kg (121 lb 0.5 oz)      Pain:    Pain Score    04/16/24 1404   PainSc: 0-No pain         Physical Exam:  Gen: WD/WN; NAD  HEENT: MMM  Trachea: midline  Chest: symmetric  Resp: normal respiratory effort  Extr: warm, well-perfused  Neuro: awake and alert; no aphasia or neglect    Plan: I have reviewed treatment setup notes, checked and approved the daily guidance images.  I reviewed dose delivery, treatment parameters and deemed them appropriate. We plan to continue radiation therapy as prescribed.      Radiation Oncology    Electronically signed by Néstor Wilson MD 4/16/2024  15:35 EDT

## 2024-04-17 ENCOUNTER — HOSPITAL ENCOUNTER (OUTPATIENT)
Dept: RADIATION ONCOLOGY | Facility: HOSPITAL | Age: 69
Discharge: HOME | End: 2024-04-17

## 2024-04-17 LAB
RAD ONC ARIA COURSE ID: NORMAL
RAD ONC ARIA COURSE INTENT: NORMAL
RAD ONC ARIA COURSE LAST TREATMENT DATE: NORMAL
RAD ONC ARIA COURSE START DATE: NORMAL
RAD ONC ARIA COURSE TREATMENT ELAPSED DAYS: 22
RAD ONC ARIA FIRST TREATMENT DATE: NORMAL
RAD ONC ARIA PLAN FRACTIONS TREATED TO DATE: 17
RAD ONC ARIA PLAN FRACTIONS TREATED TO DATE: 17
RAD ONC ARIA PLAN ID: NORMAL
RAD ONC ARIA PLAN ID: NORMAL
RAD ONC ARIA PLAN PRESCRIBED DOSE PER FRACTION: 2 GY
RAD ONC ARIA PLAN PRESCRIBED DOSE PER FRACTION: 2 GY
RAD ONC ARIA PLAN PRIMARY REFERENCE POINT: NORMAL
RAD ONC ARIA PLAN PRIMARY REFERENCE POINT: NORMAL
RAD ONC ARIA PLAN TOTAL FRACTIONS PRESCRIBED: 25
RAD ONC ARIA PLAN TOTAL FRACTIONS PRESCRIBED: 25
RAD ONC ARIA PLAN TOTAL PRESCRIBED DOSE: 5000 CGY
RAD ONC ARIA PLAN TOTAL PRESCRIBED DOSE: 5000 CGY
RAD ONC ARIA REFERENCE POINT DOSAGE GIVEN TO DATE: 34 GY
RAD ONC ARIA REFERENCE POINT DOSAGE GIVEN TO DATE: 34 GY
RAD ONC ARIA REFERENCE POINT ID: NORMAL
RAD ONC ARIA REFERENCE POINT ID: NORMAL
RAD ONC ARIA REFERENCE POINT SESSION DOSAGE GIVEN: 2 GY
RAD ONC ARIA REFERENCE POINT SESSION DOSAGE GIVEN: 2 GY

## 2024-04-17 PROCEDURE — 77387 GUIDANCE FOR RADJ TX DLVR: CPT | Performed by: RADIOLOGY

## 2024-04-17 PROCEDURE — G6002 STEREOSCOPIC X-RAY GUIDANCE: HCPCS | Performed by: RADIOLOGY

## 2024-04-17 PROCEDURE — 77412 RADIATION TX DELIVERY LVL 3: CPT | Performed by: RADIOLOGY

## 2024-04-18 ENCOUNTER — HOSPITAL ENCOUNTER (OUTPATIENT)
Dept: RADIATION ONCOLOGY | Facility: HOSPITAL | Age: 69
Discharge: HOME | End: 2024-04-18

## 2024-04-18 LAB
RAD ONC ARIA COURSE ID: NORMAL
RAD ONC ARIA COURSE INTENT: NORMAL
RAD ONC ARIA COURSE LAST TREATMENT DATE: NORMAL
RAD ONC ARIA COURSE START DATE: NORMAL
RAD ONC ARIA COURSE TREATMENT ELAPSED DAYS: 23
RAD ONC ARIA FIRST TREATMENT DATE: NORMAL
RAD ONC ARIA PLAN FRACTIONS TREATED TO DATE: 18
RAD ONC ARIA PLAN FRACTIONS TREATED TO DATE: 18
RAD ONC ARIA PLAN ID: NORMAL
RAD ONC ARIA PLAN ID: NORMAL
RAD ONC ARIA PLAN PRESCRIBED DOSE PER FRACTION: 2 GY
RAD ONC ARIA PLAN PRESCRIBED DOSE PER FRACTION: 2 GY
RAD ONC ARIA PLAN PRIMARY REFERENCE POINT: NORMAL
RAD ONC ARIA PLAN PRIMARY REFERENCE POINT: NORMAL
RAD ONC ARIA PLAN TOTAL FRACTIONS PRESCRIBED: 25
RAD ONC ARIA PLAN TOTAL FRACTIONS PRESCRIBED: 25
RAD ONC ARIA PLAN TOTAL PRESCRIBED DOSE: 5000 CGY
RAD ONC ARIA PLAN TOTAL PRESCRIBED DOSE: 5000 CGY
RAD ONC ARIA REFERENCE POINT DOSAGE GIVEN TO DATE: 36 GY
RAD ONC ARIA REFERENCE POINT DOSAGE GIVEN TO DATE: 36 GY
RAD ONC ARIA REFERENCE POINT ID: NORMAL
RAD ONC ARIA REFERENCE POINT ID: NORMAL
RAD ONC ARIA REFERENCE POINT SESSION DOSAGE GIVEN: 2 GY
RAD ONC ARIA REFERENCE POINT SESSION DOSAGE GIVEN: 2 GY

## 2024-04-18 PROCEDURE — G6002 STEREOSCOPIC X-RAY GUIDANCE: HCPCS | Performed by: RADIOLOGY

## 2024-04-18 PROCEDURE — 77412 RADIATION TX DELIVERY LVL 3: CPT | Performed by: RADIOLOGY

## 2024-04-18 PROCEDURE — 77387 GUIDANCE FOR RADJ TX DLVR: CPT | Performed by: RADIOLOGY

## 2024-04-19 ENCOUNTER — HOSPITAL ENCOUNTER (OUTPATIENT)
Dept: RADIATION ONCOLOGY | Facility: HOSPITAL | Age: 69
Setting detail: RADIATION/ONCOLOGY SERIES
Discharge: HOME | End: 2024-04-19
Payer: MEDICARE

## 2024-04-19 ENCOUNTER — HOSPITAL ENCOUNTER (OUTPATIENT)
Dept: RADIATION ONCOLOGY | Facility: HOSPITAL | Age: 69
Discharge: HOME | End: 2024-04-19

## 2024-04-19 DIAGNOSIS — C50.411 PRIMARY MALIGNANT NEOPLASM OF UPPER OUTER QUADRANT OF RIGHT FEMALE BREAST: ICD-10-CM

## 2024-04-19 LAB
RAD ONC ARIA COURSE ID: NORMAL
RAD ONC ARIA COURSE INTENT: NORMAL
RAD ONC ARIA COURSE LAST TREATMENT DATE: NORMAL
RAD ONC ARIA COURSE START DATE: NORMAL
RAD ONC ARIA COURSE TREATMENT ELAPSED DAYS: 24
RAD ONC ARIA FIRST TREATMENT DATE: NORMAL
RAD ONC ARIA PLAN FRACTIONS TREATED TO DATE: 19
RAD ONC ARIA PLAN FRACTIONS TREATED TO DATE: 19
RAD ONC ARIA PLAN ID: NORMAL
RAD ONC ARIA PLAN ID: NORMAL
RAD ONC ARIA PLAN PRESCRIBED DOSE PER FRACTION: 2 GY
RAD ONC ARIA PLAN PRESCRIBED DOSE PER FRACTION: 2 GY
RAD ONC ARIA PLAN PRIMARY REFERENCE POINT: NORMAL
RAD ONC ARIA PLAN PRIMARY REFERENCE POINT: NORMAL
RAD ONC ARIA PLAN TOTAL FRACTIONS PRESCRIBED: 25
RAD ONC ARIA PLAN TOTAL FRACTIONS PRESCRIBED: 25
RAD ONC ARIA PLAN TOTAL PRESCRIBED DOSE: 5000 CGY
RAD ONC ARIA PLAN TOTAL PRESCRIBED DOSE: 5000 CGY
RAD ONC ARIA REFERENCE POINT DOSAGE GIVEN TO DATE: 38 GY
RAD ONC ARIA REFERENCE POINT DOSAGE GIVEN TO DATE: 38 GY
RAD ONC ARIA REFERENCE POINT ID: NORMAL
RAD ONC ARIA REFERENCE POINT ID: NORMAL
RAD ONC ARIA REFERENCE POINT SESSION DOSAGE GIVEN: 2 GY
RAD ONC ARIA REFERENCE POINT SESSION DOSAGE GIVEN: 2 GY

## 2024-04-19 PROCEDURE — 77290 THER RAD SIMULAJ FIELD CPLX: CPT | Performed by: RADIOLOGY

## 2024-04-19 PROCEDURE — 77387 GUIDANCE FOR RADJ TX DLVR: CPT | Performed by: RADIOLOGY

## 2024-04-19 PROCEDURE — 77332 RADIATION TREATMENT AID(S): CPT | Performed by: RADIOLOGY

## 2024-04-19 PROCEDURE — G6002 STEREOSCOPIC X-RAY GUIDANCE: HCPCS | Performed by: RADIOLOGY

## 2024-04-19 PROCEDURE — 77412 RADIATION TX DELIVERY LVL 3: CPT | Performed by: RADIOLOGY

## 2024-04-22 ENCOUNTER — HOSPITAL ENCOUNTER (OUTPATIENT)
Dept: RADIATION ONCOLOGY | Facility: HOSPITAL | Age: 69
Discharge: HOME | End: 2024-04-22
Payer: MEDICARE

## 2024-04-22 LAB
RAD ONC ARIA COURSE ID: NORMAL
RAD ONC ARIA COURSE INTENT: NORMAL
RAD ONC ARIA COURSE LAST TREATMENT DATE: NORMAL
RAD ONC ARIA COURSE START DATE: NORMAL
RAD ONC ARIA COURSE TREATMENT ELAPSED DAYS: 27
RAD ONC ARIA FIRST TREATMENT DATE: NORMAL
RAD ONC ARIA PLAN FRACTIONS TREATED TO DATE: 20
RAD ONC ARIA PLAN FRACTIONS TREATED TO DATE: 20
RAD ONC ARIA PLAN ID: NORMAL
RAD ONC ARIA PLAN ID: NORMAL
RAD ONC ARIA PLAN PRESCRIBED DOSE PER FRACTION: 2 GY
RAD ONC ARIA PLAN PRESCRIBED DOSE PER FRACTION: 2 GY
RAD ONC ARIA PLAN PRIMARY REFERENCE POINT: NORMAL
RAD ONC ARIA PLAN PRIMARY REFERENCE POINT: NORMAL
RAD ONC ARIA PLAN TOTAL FRACTIONS PRESCRIBED: 25
RAD ONC ARIA PLAN TOTAL FRACTIONS PRESCRIBED: 25
RAD ONC ARIA PLAN TOTAL PRESCRIBED DOSE: 5000 CGY
RAD ONC ARIA PLAN TOTAL PRESCRIBED DOSE: 5000 CGY
RAD ONC ARIA REFERENCE POINT DOSAGE GIVEN TO DATE: 40 GY
RAD ONC ARIA REFERENCE POINT DOSAGE GIVEN TO DATE: 40 GY
RAD ONC ARIA REFERENCE POINT ID: NORMAL
RAD ONC ARIA REFERENCE POINT ID: NORMAL
RAD ONC ARIA REFERENCE POINT SESSION DOSAGE GIVEN: 2 GY
RAD ONC ARIA REFERENCE POINT SESSION DOSAGE GIVEN: 2 GY

## 2024-04-22 PROCEDURE — 77336 RADIATION PHYSICS CONSULT: CPT | Performed by: RADIOLOGY

## 2024-04-22 PROCEDURE — G6002 STEREOSCOPIC X-RAY GUIDANCE: HCPCS | Performed by: RADIOLOGY

## 2024-04-22 PROCEDURE — 77412 RADIATION TX DELIVERY LVL 3: CPT | Performed by: RADIOLOGY

## 2024-04-22 PROCEDURE — 77387 GUIDANCE FOR RADJ TX DLVR: CPT | Performed by: RADIOLOGY

## 2024-04-23 ENCOUNTER — HOSPITAL ENCOUNTER (OUTPATIENT)
Dept: RADIATION ONCOLOGY | Facility: HOSPITAL | Age: 69
Discharge: HOME | End: 2024-04-23

## 2024-04-23 VITALS
DIASTOLIC BLOOD PRESSURE: 64 MMHG | BODY MASS INDEX: 20.21 KG/M2 | TEMPERATURE: 97.9 F | OXYGEN SATURATION: 100 % | HEART RATE: 72 BPM | WEIGHT: 121.47 LBS | SYSTOLIC BLOOD PRESSURE: 119 MMHG | RESPIRATION RATE: 18 BRPM

## 2024-04-23 DIAGNOSIS — C50.211 MALIGNANT NEOPLASM OF UPPER-INNER QUADRANT OF RIGHT BREAST IN FEMALE, ESTROGEN RECEPTOR POSITIVE: Primary | ICD-10-CM

## 2024-04-23 DIAGNOSIS — L58.9 RADIATION DERMATITIS: ICD-10-CM

## 2024-04-23 DIAGNOSIS — Z17.0 MALIGNANT NEOPLASM OF UPPER-INNER QUADRANT OF RIGHT BREAST IN FEMALE, ESTROGEN RECEPTOR POSITIVE: Primary | ICD-10-CM

## 2024-04-23 LAB
RAD ONC ARIA COURSE ID: NORMAL
RAD ONC ARIA COURSE INTENT: NORMAL
RAD ONC ARIA COURSE LAST TREATMENT DATE: NORMAL
RAD ONC ARIA COURSE START DATE: NORMAL
RAD ONC ARIA COURSE TREATMENT ELAPSED DAYS: 28
RAD ONC ARIA FIRST TREATMENT DATE: NORMAL
RAD ONC ARIA PLAN FRACTIONS TREATED TO DATE: 21
RAD ONC ARIA PLAN FRACTIONS TREATED TO DATE: 21
RAD ONC ARIA PLAN ID: NORMAL
RAD ONC ARIA PLAN ID: NORMAL
RAD ONC ARIA PLAN PRESCRIBED DOSE PER FRACTION: 2 GY
RAD ONC ARIA PLAN PRESCRIBED DOSE PER FRACTION: 2 GY
RAD ONC ARIA PLAN PRIMARY REFERENCE POINT: NORMAL
RAD ONC ARIA PLAN PRIMARY REFERENCE POINT: NORMAL
RAD ONC ARIA PLAN TOTAL FRACTIONS PRESCRIBED: 25
RAD ONC ARIA PLAN TOTAL FRACTIONS PRESCRIBED: 25
RAD ONC ARIA PLAN TOTAL PRESCRIBED DOSE: 5000 CGY
RAD ONC ARIA PLAN TOTAL PRESCRIBED DOSE: 5000 CGY
RAD ONC ARIA REFERENCE POINT DOSAGE GIVEN TO DATE: 42 GY
RAD ONC ARIA REFERENCE POINT DOSAGE GIVEN TO DATE: 42 GY
RAD ONC ARIA REFERENCE POINT ID: NORMAL
RAD ONC ARIA REFERENCE POINT ID: NORMAL
RAD ONC ARIA REFERENCE POINT SESSION DOSAGE GIVEN: 2 GY
RAD ONC ARIA REFERENCE POINT SESSION DOSAGE GIVEN: 2 GY

## 2024-04-23 PROCEDURE — 77412 RADIATION TX DELIVERY LVL 3: CPT | Performed by: RADIOLOGY

## 2024-04-23 PROCEDURE — G6002 STEREOSCOPIC X-RAY GUIDANCE: HCPCS | Performed by: RADIOLOGY

## 2024-04-23 PROCEDURE — 77387 GUIDANCE FOR RADJ TX DLVR: CPT | Performed by: RADIOLOGY

## 2024-04-23 RX ORDER — TRIAMCINOLONE ACETONIDE 1 MG/G
1 OINTMENT TOPICAL 2 TIMES DAILY
Qty: 30 G | Refills: 0 | Status: SHIPPED | OUTPATIENT
Start: 2024-04-23 | End: 2024-04-24 | Stop reason: SDUPTHER

## 2024-04-23 NOTE — PROGRESS NOTES
On Treatment Visit       Patient: Aislinn Ledesma   YOB: 1955   Medical Record Number: [id number]     Date of Visit  April 23, 2024   Primary Diagnosis:Malignant neoplasm of upper-inner quadrant of right breast in female, estrogen receptor positive [C50.211, Z17.0]         was seen today for an on treatment visit.  She is receiving radiation therapy to the right breast and axilla. She  has received 4200 cGy in 21 fractions out of a planned dose of 5000 cGy in 25 fractions.    No significant changes since last evaluated.  Progressive right breast redness.  Development of rash centrally and posteriorly.                                             Review of Systems:   Review of Systems   Constitutional:  Positive for appetite change (decreased) and fatigue (1/10). Negative for unexpected weight change.   HENT:  Negative for hearing loss, sinus pressure, sore throat, tinnitus and trouble swallowing.    Eyes:  Negative for visual disturbance.   Respiratory:  Positive for cough (Occasional dry cough, ongoing). Negative for chest tightness, shortness of breath and wheezing.    Cardiovascular:  Negative for chest pain, palpitations and leg swelling.   Gastrointestinal:  Positive for constipation (ongoing / taking stool softners and laxatives prn.). Negative for abdominal pain, blood in stool, diarrhea, nausea and vomiting.   Genitourinary:  Positive for urgency. Negative for difficulty urinating, dysuria, frequency and hematuria.   Musculoskeletal:  Positive for arthralgias (ongoing), back pain (ongoing, scoliosis & DDD) and neck stiffness. Negative for gait problem and joint swelling.   Skin:  Positive for color change (moderate erythema, rash like appearance to area being radiated & on back). Negative for rash.   Neurological:  Negative for dizziness, weakness and headaches.   Psychiatric/Behavioral:  Positive for sleep disturbance (Wakes throughout the night, ongoing for last couple of months.).         Vitals:     Vitals:    04/23/24 1117   BP: 119/64   Pulse: 72   Resp: 18   Temp: 97.9 °F (36.6 °C)   SpO2: 100%       Weight:   Wt Readings from Last 3 Encounters:   04/23/24 55.1 kg (121 lb 7.6 oz)   04/16/24 55.3 kg (121 lb 14.6 oz)   04/09/24 55.4 kg (122 lb 2.2 oz)      Pain:    Pain Score    04/23/24 1117   PainSc: 0-No pain         Physical Exam:  Gen: WD/WN; NAD  HEENT: MMM  Trachea: midline  Chest: symmetric; papular rash centrally; hyperpigmentation with rash posteriorly on right side  Resp: normal respiratory effort  Extr: warm, well-perfused  Neuro: awake and alert; no aphasia or neglect    Plan: I have reviewed treatment setup notes, checked and approved the daily guidance images.  I reviewed dose delivery, treatment parameters and deemed them appropriate. We plan to continue radiation therapy as prescribed.    We will prescribe triamcinolone      Radiation Oncology    Electronically signed by Néstor Wilson MD 4/23/2024  12:14 EDT

## 2024-04-24 ENCOUNTER — HOSPITAL ENCOUNTER (OUTPATIENT)
Dept: RADIATION ONCOLOGY | Facility: HOSPITAL | Age: 69
Discharge: HOME | End: 2024-04-24

## 2024-04-24 DIAGNOSIS — L58.9 RADIATION DERMATITIS: ICD-10-CM

## 2024-04-24 LAB
RAD ONC ARIA COURSE ID: NORMAL
RAD ONC ARIA COURSE INTENT: NORMAL
RAD ONC ARIA COURSE LAST TREATMENT DATE: NORMAL
RAD ONC ARIA COURSE START DATE: NORMAL
RAD ONC ARIA COURSE TREATMENT ELAPSED DAYS: 29
RAD ONC ARIA FIRST TREATMENT DATE: NORMAL
RAD ONC ARIA PLAN FRACTIONS TREATED TO DATE: 22
RAD ONC ARIA PLAN FRACTIONS TREATED TO DATE: 22
RAD ONC ARIA PLAN ID: NORMAL
RAD ONC ARIA PLAN ID: NORMAL
RAD ONC ARIA PLAN PRESCRIBED DOSE PER FRACTION: 2 GY
RAD ONC ARIA PLAN PRESCRIBED DOSE PER FRACTION: 2 GY
RAD ONC ARIA PLAN PRIMARY REFERENCE POINT: NORMAL
RAD ONC ARIA PLAN PRIMARY REFERENCE POINT: NORMAL
RAD ONC ARIA PLAN TOTAL FRACTIONS PRESCRIBED: 25
RAD ONC ARIA PLAN TOTAL FRACTIONS PRESCRIBED: 25
RAD ONC ARIA PLAN TOTAL PRESCRIBED DOSE: 5000 CGY
RAD ONC ARIA PLAN TOTAL PRESCRIBED DOSE: 5000 CGY
RAD ONC ARIA REFERENCE POINT DOSAGE GIVEN TO DATE: 44 GY
RAD ONC ARIA REFERENCE POINT DOSAGE GIVEN TO DATE: 44 GY
RAD ONC ARIA REFERENCE POINT ID: NORMAL
RAD ONC ARIA REFERENCE POINT ID: NORMAL
RAD ONC ARIA REFERENCE POINT SESSION DOSAGE GIVEN: 2 GY
RAD ONC ARIA REFERENCE POINT SESSION DOSAGE GIVEN: 2 GY

## 2024-04-24 PROCEDURE — G6002 STEREOSCOPIC X-RAY GUIDANCE: HCPCS | Performed by: RADIOLOGY

## 2024-04-24 PROCEDURE — 77412 RADIATION TX DELIVERY LVL 3: CPT | Performed by: RADIOLOGY

## 2024-04-24 PROCEDURE — 77387 GUIDANCE FOR RADJ TX DLVR: CPT | Performed by: RADIOLOGY

## 2024-04-24 RX ORDER — TRIAMCINOLONE ACETONIDE 1 MG/G
1 OINTMENT TOPICAL 2 TIMES DAILY
Qty: 30 G | Refills: 0 | Status: SHIPPED | OUTPATIENT
Start: 2024-04-24

## 2024-04-25 ENCOUNTER — HOSPITAL ENCOUNTER (OUTPATIENT)
Dept: RADIATION ONCOLOGY | Facility: HOSPITAL | Age: 69
Discharge: HOME | End: 2024-04-25

## 2024-04-25 LAB
RAD ONC ARIA COURSE ID: NORMAL
RAD ONC ARIA COURSE INTENT: NORMAL
RAD ONC ARIA COURSE LAST TREATMENT DATE: NORMAL
RAD ONC ARIA COURSE START DATE: NORMAL
RAD ONC ARIA COURSE TREATMENT ELAPSED DAYS: 30
RAD ONC ARIA FIRST TREATMENT DATE: NORMAL
RAD ONC ARIA PLAN FRACTIONS TREATED TO DATE: 23
RAD ONC ARIA PLAN FRACTIONS TREATED TO DATE: 23
RAD ONC ARIA PLAN ID: NORMAL
RAD ONC ARIA PLAN ID: NORMAL
RAD ONC ARIA PLAN PRESCRIBED DOSE PER FRACTION: 2 GY
RAD ONC ARIA PLAN PRESCRIBED DOSE PER FRACTION: 2 GY
RAD ONC ARIA PLAN PRIMARY REFERENCE POINT: NORMAL
RAD ONC ARIA PLAN PRIMARY REFERENCE POINT: NORMAL
RAD ONC ARIA PLAN TOTAL FRACTIONS PRESCRIBED: 25
RAD ONC ARIA PLAN TOTAL FRACTIONS PRESCRIBED: 25
RAD ONC ARIA PLAN TOTAL PRESCRIBED DOSE: 5000 CGY
RAD ONC ARIA PLAN TOTAL PRESCRIBED DOSE: 5000 CGY
RAD ONC ARIA REFERENCE POINT DOSAGE GIVEN TO DATE: 46 GY
RAD ONC ARIA REFERENCE POINT DOSAGE GIVEN TO DATE: 46 GY
RAD ONC ARIA REFERENCE POINT ID: NORMAL
RAD ONC ARIA REFERENCE POINT ID: NORMAL
RAD ONC ARIA REFERENCE POINT SESSION DOSAGE GIVEN: 2 GY
RAD ONC ARIA REFERENCE POINT SESSION DOSAGE GIVEN: 2 GY

## 2024-04-25 PROCEDURE — 77412 RADIATION TX DELIVERY LVL 3: CPT | Performed by: RADIOLOGY

## 2024-04-25 PROCEDURE — 77387 GUIDANCE FOR RADJ TX DLVR: CPT | Performed by: RADIOLOGY

## 2024-04-26 ENCOUNTER — HOSPITAL ENCOUNTER (OUTPATIENT)
Dept: RADIATION ONCOLOGY | Facility: HOSPITAL | Age: 69
Discharge: HOME | End: 2024-04-26

## 2024-04-26 LAB
RAD ONC ARIA COURSE ID: NORMAL
RAD ONC ARIA COURSE INTENT: NORMAL
RAD ONC ARIA COURSE LAST TREATMENT DATE: NORMAL
RAD ONC ARIA COURSE START DATE: NORMAL
RAD ONC ARIA COURSE TREATMENT ELAPSED DAYS: 31
RAD ONC ARIA FIRST TREATMENT DATE: NORMAL
RAD ONC ARIA PLAN FRACTIONS TREATED TO DATE: 24
RAD ONC ARIA PLAN FRACTIONS TREATED TO DATE: 24
RAD ONC ARIA PLAN ID: NORMAL
RAD ONC ARIA PLAN ID: NORMAL
RAD ONC ARIA PLAN PRESCRIBED DOSE PER FRACTION: 2 GY
RAD ONC ARIA PLAN PRESCRIBED DOSE PER FRACTION: 2 GY
RAD ONC ARIA PLAN PRIMARY REFERENCE POINT: NORMAL
RAD ONC ARIA PLAN PRIMARY REFERENCE POINT: NORMAL
RAD ONC ARIA PLAN TOTAL FRACTIONS PRESCRIBED: 25
RAD ONC ARIA PLAN TOTAL FRACTIONS PRESCRIBED: 25
RAD ONC ARIA PLAN TOTAL PRESCRIBED DOSE: 5000 CGY
RAD ONC ARIA PLAN TOTAL PRESCRIBED DOSE: 5000 CGY
RAD ONC ARIA REFERENCE POINT DOSAGE GIVEN TO DATE: 48 GY
RAD ONC ARIA REFERENCE POINT DOSAGE GIVEN TO DATE: 48 GY
RAD ONC ARIA REFERENCE POINT ID: NORMAL
RAD ONC ARIA REFERENCE POINT ID: NORMAL
RAD ONC ARIA REFERENCE POINT SESSION DOSAGE GIVEN: 2 GY
RAD ONC ARIA REFERENCE POINT SESSION DOSAGE GIVEN: 2 GY

## 2024-04-26 PROCEDURE — 77387 GUIDANCE FOR RADJ TX DLVR: CPT | Performed by: RADIOLOGY

## 2024-04-26 PROCEDURE — 77412 RADIATION TX DELIVERY LVL 3: CPT | Performed by: RADIOLOGY

## 2024-04-28 PROCEDURE — 77295 3-D RADIOTHERAPY PLAN: CPT | Performed by: RADIOLOGY

## 2024-04-28 PROCEDURE — 77300 RADIATION THERAPY DOSE PLAN: CPT | Performed by: RADIOLOGY

## 2024-04-28 PROCEDURE — 77334 RADIATION TREATMENT AID(S): CPT | Performed by: RADIOLOGY

## 2024-04-29 ENCOUNTER — APPOINTMENT (OUTPATIENT)
Dept: RADIATION ONCOLOGY | Facility: HOSPITAL | Age: 69
End: 2024-04-29
Payer: MEDICARE

## 2024-04-29 ENCOUNTER — PATIENT OUTREACH (OUTPATIENT)
Dept: ONCOLOGY | Facility: HOSPITAL | Age: 69
End: 2024-04-29
Payer: MEDICARE

## 2024-04-30 ENCOUNTER — APPOINTMENT (OUTPATIENT)
Dept: RADIATION ONCOLOGY | Facility: HOSPITAL | Age: 69
End: 2024-04-30
Payer: MEDICARE

## 2024-04-30 ENCOUNTER — HOSPITAL ENCOUNTER (OUTPATIENT)
Dept: RADIATION ONCOLOGY | Facility: HOSPITAL | Age: 69
Discharge: HOME | End: 2024-04-30

## 2024-04-30 ENCOUNTER — HOSPITAL ENCOUNTER (OUTPATIENT)
Dept: RADIATION ONCOLOGY | Facility: HOSPITAL | Age: 69
Setting detail: RADIATION/ONCOLOGY SERIES
Discharge: HOME | End: 2024-04-30
Payer: MEDICARE

## 2024-04-30 VITALS
RESPIRATION RATE: 17 BRPM | DIASTOLIC BLOOD PRESSURE: 66 MMHG | WEIGHT: 119.93 LBS | OXYGEN SATURATION: 100 % | HEART RATE: 66 BPM | TEMPERATURE: 97.7 F | SYSTOLIC BLOOD PRESSURE: 101 MMHG | BODY MASS INDEX: 19.96 KG/M2

## 2024-04-30 DIAGNOSIS — C50.211 MALIGNANT NEOPLASM OF UPPER-INNER QUADRANT OF RIGHT BREAST IN FEMALE, ESTROGEN RECEPTOR POSITIVE: Primary | ICD-10-CM

## 2024-04-30 DIAGNOSIS — Z17.0 MALIGNANT NEOPLASM OF UPPER-INNER QUADRANT OF RIGHT BREAST IN FEMALE, ESTROGEN RECEPTOR POSITIVE: Primary | ICD-10-CM

## 2024-04-30 LAB
RAD ONC ARIA COURSE ID: NORMAL
RAD ONC ARIA COURSE INTENT: NORMAL
RAD ONC ARIA COURSE LAST TREATMENT DATE: NORMAL
RAD ONC ARIA COURSE START DATE: NORMAL
RAD ONC ARIA COURSE TREATMENT ELAPSED DAYS: 35
RAD ONC ARIA FIRST TREATMENT DATE: NORMAL
RAD ONC ARIA PLAN FRACTIONS TREATED TO DATE: 25
RAD ONC ARIA PLAN FRACTIONS TREATED TO DATE: 25
RAD ONC ARIA PLAN ID: NORMAL
RAD ONC ARIA PLAN ID: NORMAL
RAD ONC ARIA PLAN PRESCRIBED DOSE PER FRACTION: 2 GY
RAD ONC ARIA PLAN PRESCRIBED DOSE PER FRACTION: 2 GY
RAD ONC ARIA PLAN PRIMARY REFERENCE POINT: NORMAL
RAD ONC ARIA PLAN PRIMARY REFERENCE POINT: NORMAL
RAD ONC ARIA PLAN TOTAL FRACTIONS PRESCRIBED: 25
RAD ONC ARIA PLAN TOTAL FRACTIONS PRESCRIBED: 25
RAD ONC ARIA PLAN TOTAL PRESCRIBED DOSE: 5000 CGY
RAD ONC ARIA PLAN TOTAL PRESCRIBED DOSE: 5000 CGY
RAD ONC ARIA REFERENCE POINT DOSAGE GIVEN TO DATE: 50 GY
RAD ONC ARIA REFERENCE POINT DOSAGE GIVEN TO DATE: 50 GY
RAD ONC ARIA REFERENCE POINT ID: NORMAL
RAD ONC ARIA REFERENCE POINT ID: NORMAL
RAD ONC ARIA REFERENCE POINT SESSION DOSAGE GIVEN: 2 GY
RAD ONC ARIA REFERENCE POINT SESSION DOSAGE GIVEN: 2 GY

## 2024-04-30 PROCEDURE — 77412 RADIATION TX DELIVERY LVL 3: CPT | Performed by: RADIOLOGY

## 2024-04-30 PROCEDURE — 77387 GUIDANCE FOR RADJ TX DLVR: CPT | Performed by: RADIOLOGY

## 2024-04-30 PROCEDURE — 77336 RADIATION PHYSICS CONSULT: CPT | Performed by: RADIOLOGY

## 2024-04-30 NOTE — PROGRESS NOTES
On Treatment Visit       Patient: Aislinn Ledesma   YOB: 1955   Medical Record Number: [id number]     Date of Visit  April 30, 2024   Primary Diagnosis:Malignant neoplasm of upper-inner quadrant of right breast in female, estrogen receptor positive [C50.211, Z17.0]         was seen today for an on treatment visit.  She is receiving radiation therapy to the right breast and axilla. She  has received 5000cGy in 25 fractions out of a planned dose of 5000 cGy in 25 fractions.    Progression of right breast redness and rash.  Using triamcinolone with no significant improvement.                                             Review of Systems:   Review of Systems   Constitutional:  Negative for appetite change, chills, fatigue (0/10) and unexpected weight change.   HENT:  Negative for hearing loss, sore throat, tinnitus and trouble swallowing.    Eyes:  Negative for visual disturbance.   Respiratory:  Negative for cough, chest tightness, shortness of breath and wheezing.    Cardiovascular:  Negative for chest pain, palpitations and leg swelling.   Gastrointestinal:  Positive for constipation (ongoing / taking stool softners and laxatives prn.). Negative for abdominal pain, blood in stool, diarrhea, nausea and vomiting.   Genitourinary:  Positive for urgency. Negative for difficulty urinating, dysuria, frequency and hematuria.   Musculoskeletal:  Positive for arthralgias (ongoing), back pain (ongoing, scoliosis & DDD) and neck stiffness. Negative for gait problem and joint swelling.   Skin:  Positive for color change (moderate erythema, rash like appearance to area being radiated & on back) and rash (to front and back of breast area).   Neurological:  Negative for dizziness, weakness, light-headedness and headaches.   Psychiatric/Behavioral:  Positive for sleep disturbance (Wakes throughout the night, ongoing for last couple of months.).        Vitals:     Vitals:    04/30/24 1147   BP: 101/66    Pulse: 66   Resp: 17   Temp: 97.7 °F (36.5 °C)   SpO2: 100%       Weight:   Wt Readings from Last 3 Encounters:   04/30/24 54.4 kg (119 lb 14.9 oz)   04/23/24 55.1 kg (121 lb 7.6 oz)   04/16/24 55.3 kg (121 lb 14.6 oz)      Pain:    Pain Score    04/30/24 1147   PainSc: 0-No pain         Physical Exam:  Gen: WD/WN; NAD  HEENT: MMM  Trachea: midline  Chest: symmetric; papular rash centrally; hyperpigmentation with rash posteriorly on right side  Resp: normal respiratory effort  Extr: warm, well-perfused  Neuro: awake and alert; no aphasia or neglect    Plan: I have reviewed treatment setup notes, checked and approved the daily guidance images.  I reviewed dose delivery, treatment parameters and deemed them appropriate. We plan to continue radiation therapy as prescribed.    Advised continued skin care with using triamcinolone.  Will continue to use other emollients.  Boost portion of radiotherapy course to commence tomorrow      Radiation Oncology    Electronically signed by Néstor Wilson MD 4/30/2024  12:59 EDT

## 2024-05-01 ENCOUNTER — LAB REQUISITION (OUTPATIENT)
Dept: LAB | Facility: HOSPITAL | Age: 69
End: 2024-05-01
Payer: MEDICARE

## 2024-05-01 ENCOUNTER — HOSPITAL ENCOUNTER (OUTPATIENT)
Dept: RADIATION ONCOLOGY | Facility: HOSPITAL | Age: 69
Setting detail: RADIATION/ONCOLOGY SERIES
Discharge: HOME | End: 2024-05-01
Payer: MEDICARE

## 2024-05-01 ENCOUNTER — HOSPITAL ENCOUNTER (OUTPATIENT)
Dept: RADIATION ONCOLOGY | Facility: HOSPITAL | Age: 69
Setting detail: RADIATION/ONCOLOGY SERIES
End: 2024-05-01
Payer: MEDICARE

## 2024-05-01 DIAGNOSIS — C44.501 UNSPECIFIED MALIGNANT NEOPLASM OF SKIN OF BREAST: ICD-10-CM

## 2024-05-01 LAB
RAD ONC ARIA COURSE ID: NORMAL
RAD ONC ARIA COURSE INTENT: NORMAL
RAD ONC ARIA COURSE LAST TREATMENT DATE: NORMAL
RAD ONC ARIA COURSE START DATE: NORMAL
RAD ONC ARIA COURSE TREATMENT ELAPSED DAYS: 36
RAD ONC ARIA FIRST TREATMENT DATE: NORMAL
RAD ONC ARIA PLAN FRACTIONS TREATED TO DATE: 1
RAD ONC ARIA PLAN ID: NORMAL
RAD ONC ARIA PLAN PRESCRIBED DOSE PER FRACTION: 2 GY
RAD ONC ARIA PLAN PRIMARY REFERENCE POINT: NORMAL
RAD ONC ARIA PLAN TOTAL FRACTIONS PRESCRIBED: 5
RAD ONC ARIA PLAN TOTAL PRESCRIBED DOSE: 1000 CGY
RAD ONC ARIA REFERENCE POINT DOSAGE GIVEN TO DATE: 2 GY
RAD ONC ARIA REFERENCE POINT ID: NORMAL
RAD ONC ARIA REFERENCE POINT SESSION DOSAGE GIVEN: 2 GY

## 2024-05-01 PROCEDURE — 77387 GUIDANCE FOR RADJ TX DLVR: CPT | Performed by: RADIOLOGY

## 2024-05-01 PROCEDURE — 77412 RADIATION TX DELIVERY LVL 3: CPT | Performed by: RADIOLOGY

## 2024-05-01 PROCEDURE — G6002 STEREOSCOPIC X-RAY GUIDANCE: HCPCS | Performed by: RADIOLOGY

## 2024-05-02 ENCOUNTER — HOSPITAL ENCOUNTER (OUTPATIENT)
Dept: RADIATION ONCOLOGY | Facility: HOSPITAL | Age: 69
Setting detail: RADIATION/ONCOLOGY SERIES
Discharge: HOME | End: 2024-05-02
Payer: MEDICARE

## 2024-05-02 ENCOUNTER — DOCUMENTATION (OUTPATIENT)
Dept: NUTRITION | Facility: HOSPITAL | Age: 69
End: 2024-05-02
Payer: MEDICARE

## 2024-05-02 LAB
CYTO UR: NORMAL
LAB AP CASE REPORT: NORMAL
LAB AP CLINICAL INFORMATION: NORMAL
LAB AP DIAGNOSIS COMMENT: NORMAL
LAB AP SPECIAL STAINS: NORMAL
PATH REPORT.FINAL DX SPEC: NORMAL
PATH REPORT.GROSS SPEC: NORMAL
RAD ONC ARIA COURSE ID: NORMAL
RAD ONC ARIA COURSE INTENT: NORMAL
RAD ONC ARIA COURSE LAST TREATMENT DATE: NORMAL
RAD ONC ARIA COURSE START DATE: NORMAL
RAD ONC ARIA COURSE TREATMENT ELAPSED DAYS: 37
RAD ONC ARIA FIRST TREATMENT DATE: NORMAL
RAD ONC ARIA PLAN FRACTIONS TREATED TO DATE: 2
RAD ONC ARIA PLAN ID: NORMAL
RAD ONC ARIA PLAN PRESCRIBED DOSE PER FRACTION: 2 GY
RAD ONC ARIA PLAN PRIMARY REFERENCE POINT: NORMAL
RAD ONC ARIA PLAN TOTAL FRACTIONS PRESCRIBED: 5
RAD ONC ARIA PLAN TOTAL PRESCRIBED DOSE: 1000 CGY
RAD ONC ARIA REFERENCE POINT DOSAGE GIVEN TO DATE: 4 GY
RAD ONC ARIA REFERENCE POINT ID: NORMAL
RAD ONC ARIA REFERENCE POINT SESSION DOSAGE GIVEN: 2 GY

## 2024-05-02 PROCEDURE — G6002 STEREOSCOPIC X-RAY GUIDANCE: HCPCS | Performed by: RADIOLOGY

## 2024-05-02 PROCEDURE — 77387 GUIDANCE FOR RADJ TX DLVR: CPT | Performed by: RADIOLOGY

## 2024-05-02 PROCEDURE — 77412 RADIATION TX DELIVERY LVL 3: CPT | Performed by: RADIOLOGY

## 2024-05-02 NOTE — PROGRESS NOTES
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    Freiberg's disease, right M92.71    Hallux valgus of right foot M20.11    Malignant neoplasm of upper-outer quadrant of right female breast, unspecified estrogen receptor status C50.411         Anthropometrics       Row Name 05/02/24 1259          Anthropometrics    Weight for Calculation 54.4 kg (119 lb 14.9 oz)                         Weight Hx  Wt Readings from Last 30 Encounters:   04/30/24 1147 54.4 kg (119 lb 14.9 oz)   04/23/24 1117 55.1 kg (121 lb 7.6 oz)   04/16/24 1404 55.3 kg (121 lb 14.6 oz)   04/09/24 1405 55.4 kg (122 lb 2.2 oz)   04/03/24 1405 54.9 kg (121 lb 0.5 oz)   03/15/24 1421 55.3 kg (121 lb 14.6 oz)   07/25/19 1304 59.4 kg (131 lb)   12/17/18 1338 59.4 kg (131 lb)   09/06/18 1341 59 kg (130 lb)   09/06/18 1338 59 kg (130 lb)   07/27/18 1534 59 kg (130 lb)   07/18/18 1133 59 kg (130 lb)   02/17/17 1611 59 kg (130 lb)   01/06/17 1523 59 kg (130 lb)         Estimated/Assessed Needs - Anthropometrics       Row Name 05/02/24 1259          Anthropometrics    Weight for Calculation 54.4 kg (119 lb 14.9 oz)        Estimated/Assessed Needs    Additional Documentation Fluid Requirements (Group);Protein Requirements (Group);KCAL/KG (Group)        KCAL/KG    KCAL/KG 25 Kcal/Kg (kcal)     25 Kcal/Kg (kcal) 1360        Protein Requirements    Weight Used For Protein Calculations 54.4 kg (119 lb 14.9 oz)     Est Protein Requirement Amount (gms/kg) 1.2 gm protein      "Estimated Protein Requirements (gms/day) 65.28        Fluid Requirements    Fluid Requirements (mL/day) 1360     RDA Method (mL) 1360                      Labs/Medications        Pertinent Labs Reviewed.       Pertinent Medications clonazePAM, famotidine, fish oil, letrozole, levothyroxine, naproxen sodium, ondansetron ODT, pantoprazole, prochlorperazine, senna, sertraline, simvastatin, triamcinolone, and vitamin D     Current Nutrition Orders & Evaluation of Intake       Oral Nutrition     Current PO Diet Eats a variety of protein sources, moderate variety of fruits & vegetables, tries to eat healthy fats   Supplement      Nutrition Diagnosis        Nutrition Dx Problem 1 Food and nutrition knowledge deficit related to  no prior nutrition education given  as evidenced by pt report / request for nutrition education.       Nutrition Intervention       RD Action Nutrition assessment by review of pt's medical record + pt interview (including discussion on):  Pt's inquiry on nutritional topics  Current diet / schedule / food tolerances / ONS    Reviewed:  Ideas for lean proteins, healthy fats, general / healthy diet advice for breast cancer patients during survivorship phase    (Pt already has the breast cancer handout for survivors)    Provided on 5/3/24:  Nutrition Fact Sheet:  \"Healthy Fat is Good For Your Body\"  Alphabetical List of Fruits and Vegetables  Extensive protein food source list:  \"Protein Content of Common Foods\"     Monitor/Evaluation       Monitor Per oncology nutrition protocol.     Comments:    Pt requested nutrition information regarding foods / diet for after tx is complete.  She is almost through with XRT.  Pt had several questions re:  nutrition, such as sugar cravings, lean protein sources, and adding in more fruits and vegetables.  Provided answers to pt's questions and staff will provide written information, as above, on 5/3/24.      Electronically signed by:  Nohemy Aldana RD  05/02/24 " 13:00 EDT

## 2024-05-03 ENCOUNTER — PATIENT OUTREACH (OUTPATIENT)
Dept: ONCOLOGY | Facility: HOSPITAL | Age: 69
End: 2024-05-03
Payer: MEDICARE

## 2024-05-03 ENCOUNTER — HOSPITAL ENCOUNTER (OUTPATIENT)
Dept: RADIATION ONCOLOGY | Facility: HOSPITAL | Age: 69
Setting detail: RADIATION/ONCOLOGY SERIES
Discharge: HOME | End: 2024-05-03
Payer: MEDICARE

## 2024-05-03 LAB
RAD ONC ARIA COURSE ID: NORMAL
RAD ONC ARIA COURSE INTENT: NORMAL
RAD ONC ARIA COURSE LAST TREATMENT DATE: NORMAL
RAD ONC ARIA COURSE START DATE: NORMAL
RAD ONC ARIA COURSE TREATMENT ELAPSED DAYS: 38
RAD ONC ARIA FIRST TREATMENT DATE: NORMAL
RAD ONC ARIA PLAN FRACTIONS TREATED TO DATE: 3
RAD ONC ARIA PLAN ID: NORMAL
RAD ONC ARIA PLAN PRESCRIBED DOSE PER FRACTION: 2 GY
RAD ONC ARIA PLAN PRIMARY REFERENCE POINT: NORMAL
RAD ONC ARIA PLAN TOTAL FRACTIONS PRESCRIBED: 5
RAD ONC ARIA PLAN TOTAL PRESCRIBED DOSE: 1000 CGY
RAD ONC ARIA REFERENCE POINT DOSAGE GIVEN TO DATE: 6 GY
RAD ONC ARIA REFERENCE POINT ID: NORMAL
RAD ONC ARIA REFERENCE POINT SESSION DOSAGE GIVEN: 2 GY

## 2024-05-03 PROCEDURE — G6002 STEREOSCOPIC X-RAY GUIDANCE: HCPCS | Performed by: RADIOLOGY

## 2024-05-03 PROCEDURE — 77412 RADIATION TX DELIVERY LVL 3: CPT | Performed by: RADIOLOGY

## 2024-05-03 PROCEDURE — 77387 GUIDANCE FOR RADJ TX DLVR: CPT | Performed by: RADIOLOGY

## 2024-05-06 ENCOUNTER — APPOINTMENT (OUTPATIENT)
Dept: RADIATION ONCOLOGY | Facility: HOSPITAL | Age: 69
End: 2024-05-06
Payer: MEDICARE

## 2024-05-06 ENCOUNTER — HOSPITAL ENCOUNTER (OUTPATIENT)
Dept: RADIATION ONCOLOGY | Facility: HOSPITAL | Age: 69
Setting detail: RADIATION/ONCOLOGY SERIES
Discharge: HOME | End: 2024-05-06
Payer: MEDICARE

## 2024-05-06 LAB
RAD ONC ARIA COURSE ID: NORMAL
RAD ONC ARIA COURSE INTENT: NORMAL
RAD ONC ARIA COURSE LAST TREATMENT DATE: NORMAL
RAD ONC ARIA COURSE START DATE: NORMAL
RAD ONC ARIA COURSE TREATMENT ELAPSED DAYS: 41
RAD ONC ARIA FIRST TREATMENT DATE: NORMAL
RAD ONC ARIA PLAN FRACTIONS TREATED TO DATE: 4
RAD ONC ARIA PLAN ID: NORMAL
RAD ONC ARIA PLAN PRESCRIBED DOSE PER FRACTION: 2 GY
RAD ONC ARIA PLAN PRIMARY REFERENCE POINT: NORMAL
RAD ONC ARIA PLAN TOTAL FRACTIONS PRESCRIBED: 5
RAD ONC ARIA PLAN TOTAL PRESCRIBED DOSE: 1000 CGY
RAD ONC ARIA REFERENCE POINT DOSAGE GIVEN TO DATE: 8 GY
RAD ONC ARIA REFERENCE POINT ID: NORMAL
RAD ONC ARIA REFERENCE POINT SESSION DOSAGE GIVEN: 2 GY

## 2024-05-06 PROCEDURE — 77387 GUIDANCE FOR RADJ TX DLVR: CPT | Performed by: RADIOLOGY

## 2024-05-06 PROCEDURE — G6002 STEREOSCOPIC X-RAY GUIDANCE: HCPCS | Performed by: RADIOLOGY

## 2024-05-06 PROCEDURE — 77412 RADIATION TX DELIVERY LVL 3: CPT | Performed by: RADIOLOGY

## 2024-05-07 ENCOUNTER — HOSPITAL ENCOUNTER (OUTPATIENT)
Dept: RADIATION ONCOLOGY | Facility: HOSPITAL | Age: 69
Setting detail: RADIATION/ONCOLOGY SERIES
Discharge: HOME | End: 2024-05-07
Payer: MEDICARE

## 2024-05-07 VITALS
HEART RATE: 70 BPM | SYSTOLIC BLOOD PRESSURE: 102 MMHG | WEIGHT: 118.83 LBS | OXYGEN SATURATION: 97 % | DIASTOLIC BLOOD PRESSURE: 68 MMHG | BODY MASS INDEX: 19.77 KG/M2 | TEMPERATURE: 98.7 F | RESPIRATION RATE: 16 BRPM

## 2024-05-07 DIAGNOSIS — Z17.0 MALIGNANT NEOPLASM OF UPPER-INNER QUADRANT OF RIGHT BREAST IN FEMALE, ESTROGEN RECEPTOR POSITIVE: Primary | ICD-10-CM

## 2024-05-07 DIAGNOSIS — C50.211 MALIGNANT NEOPLASM OF UPPER-INNER QUADRANT OF RIGHT BREAST IN FEMALE, ESTROGEN RECEPTOR POSITIVE: Primary | ICD-10-CM

## 2024-05-07 LAB
RAD ONC ARIA COURSE ID: NORMAL
RAD ONC ARIA COURSE INTENT: NORMAL
RAD ONC ARIA COURSE LAST TREATMENT DATE: NORMAL
RAD ONC ARIA COURSE START DATE: NORMAL
RAD ONC ARIA COURSE TREATMENT ELAPSED DAYS: 42
RAD ONC ARIA FIRST TREATMENT DATE: NORMAL
RAD ONC ARIA PLAN FRACTIONS TREATED TO DATE: 5
RAD ONC ARIA PLAN ID: NORMAL
RAD ONC ARIA PLAN PRESCRIBED DOSE PER FRACTION: 2 GY
RAD ONC ARIA PLAN PRIMARY REFERENCE POINT: NORMAL
RAD ONC ARIA PLAN TOTAL FRACTIONS PRESCRIBED: 5
RAD ONC ARIA PLAN TOTAL PRESCRIBED DOSE: 1000 CGY
RAD ONC ARIA REFERENCE POINT DOSAGE GIVEN TO DATE: 10 GY
RAD ONC ARIA REFERENCE POINT ID: NORMAL
RAD ONC ARIA REFERENCE POINT SESSION DOSAGE GIVEN: 2 GY

## 2024-05-07 PROCEDURE — 77387 GUIDANCE FOR RADJ TX DLVR: CPT | Performed by: RADIOLOGY

## 2024-05-07 PROCEDURE — 77412 RADIATION TX DELIVERY LVL 3: CPT | Performed by: RADIOLOGY

## 2024-05-07 PROCEDURE — 77336 RADIATION PHYSICS CONSULT: CPT | Performed by: RADIOLOGY

## 2024-06-05 ENCOUNTER — PATIENT ROUNDING (BHMG ONLY) (OUTPATIENT)
Dept: RADIATION ONCOLOGY | Facility: HOSPITAL | Age: 69
End: 2024-06-05
Payer: MEDICARE

## 2024-06-05 NOTE — PROGRESS NOTES
"Patient completed radiation treatment for breast cancer on 5/7/24. Following up with patient regarding any concerns the patient may have at this time and receiving feedback in regards to patient over all care while under treatment.     Patient asked about symptoms and side effects that continue to be bothersome. Pt denies any issues with skin at this time. Pt states she has had some fatigue since completing treatment but it is improving.     Patient states that Pain is at 0 on scale of 0/10. Patient states medications have not changed.    Patient was asked if there was anything that could've made their experience better at [facility] while they were under treatment. Patient states: \"absolutely not you guys were just wonderful.\"    Patient encouraged to call the office if any concerns arise. Patient reminded of Follow up appointment on 6/17/24 DM NUR  "

## 2024-06-17 ENCOUNTER — OFFICE VISIT (OUTPATIENT)
Dept: RADIATION ONCOLOGY | Facility: HOSPITAL | Age: 69
End: 2024-06-17
Payer: MEDICARE

## 2024-06-17 VITALS
OXYGEN SATURATION: 100 % | HEART RATE: 67 BPM | RESPIRATION RATE: 18 BRPM | BODY MASS INDEX: 19.55 KG/M2 | TEMPERATURE: 97.5 F | WEIGHT: 117.5 LBS | SYSTOLIC BLOOD PRESSURE: 110 MMHG | DIASTOLIC BLOOD PRESSURE: 73 MMHG

## 2024-06-17 DIAGNOSIS — Z08 ENCOUNTER FOR FOLLOW-UP EXAMINATION AFTER COMPLETED TREATMENT FOR MALIGNANT NEOPLASM: ICD-10-CM

## 2024-06-17 DIAGNOSIS — R91.1 PULMONARY NODULE: ICD-10-CM

## 2024-06-17 DIAGNOSIS — Z92.3 STATUS POST RADIATION THERAPY WITHIN FOUR TO TWELVE WEEKS: ICD-10-CM

## 2024-06-17 DIAGNOSIS — Z91.89 AT RISK FOR LYMPHEDEMA: ICD-10-CM

## 2024-06-17 DIAGNOSIS — C50.411 MALIGNANT NEOPLASM OF UPPER-OUTER QUADRANT OF RIGHT BREAST IN FEMALE, ESTROGEN RECEPTOR POSITIVE: Primary | ICD-10-CM

## 2024-06-17 DIAGNOSIS — Z79.811 USE OF LETROZOLE (FEMARA): ICD-10-CM

## 2024-06-17 DIAGNOSIS — Z17.0 MALIGNANT NEOPLASM OF UPPER-OUTER QUADRANT OF RIGHT BREAST IN FEMALE, ESTROGEN RECEPTOR POSITIVE: Primary | ICD-10-CM

## 2024-06-17 DIAGNOSIS — C77.3 SECONDARY MALIGNANT NEOPLASM OF AXILLARY LYMPH NODES: ICD-10-CM

## 2024-06-17 PROCEDURE — G0463 HOSPITAL OUTPT CLINIC VISIT: HCPCS | Performed by: NURSE PRACTITIONER

## 2024-06-17 NOTE — PROGRESS NOTES
Follow Up Office Visit      Encounter Date: 06/17/2024   Patient Name: Aislinn Ledesma  YOB: 1955   Medical Record Number: [id number]   Primary Diagnosis: Malignant neoplasm of upper-outer quadrant of right breast in female, estrogen receptor positive [C50.411, Z17.0]     Cancer Staging   No matching staging information was found for the patient.    Radiation Completion Date:  5/7/2024    Chief Complaint:    Chief Complaint   Patient presents with    Follow-up    Breast Cancer       Oncology/Hematology History   Malignant neoplasm of upper-outer quadrant of right female breast, unspecified estrogen receptor status   11/2/2023 Cancer Staged    Staging form: Breast, AJCC 8th Edition  - Pathologic stage from 11/2/2023: Stage IB (pT1c, pN2, cM0, G2, ER+, NJ+, HER2-, Oncotype DX score: 7) - Signed by Arin Barrios APRN on 6/17/2024     3/18/2024 Initial Diagnosis    Malignant neoplasm of upper-outer quadrant of right female breast, unspecified estrogen receptor status     3/26/2024 - 5/7/2024 Radiation    Radiation OncologyTreatment Course:  Aislinn Ledesma received a total of 5000 cGy in 25 fractions delivered to the right breast and axilla. This was followed by 10 Hill boost to the right breast in 5 fractions of 2 Gray.          History of Present Illness: Aislinn Ledesma is a 68 y.o. female who returns to [facility] for short interval follow-up after completing external beam radiotherapy on 5/7/24. She reports feeling well overall with no treatment related concerns. She denies any significant skin breakdown in the weeks following completion of radiotherapy. She has no breast related concerns today. Denies breast pain or tenderness, palpable masses, nipple changes or nipple discharge, limited range of motion in upper extremity. She is currently receiving letrozole, Verzenio and Zometa per Dr. Coley. She reports previously being on anastrozole and experiencing significant hip pain.  She reports experiencing hot flashes that are tolerable for her. She has been evaluated by lymphedema program at [place].      Subjective      Review of Systems: Review of Systems   Constitutional:  Positive for unexpected weight change (down a few pounds due to cutting back on sugar). Negative for chills and fatigue.   HENT:  Negative for sore throat and trouble swallowing.    Eyes:  Negative for visual disturbance.   Respiratory:  Negative for cough, chest tightness and shortness of breath.    Cardiovascular:  Negative for chest pain, palpitations and leg swelling.   Gastrointestinal:  Positive for constipation (chronic, uses senna when needed.). Negative for abdominal pain, blood in stool, diarrhea, nausea and vomiting.   Endocrine: Positive for heat intolerance (hot flashes.).   Genitourinary:  Negative for difficulty urinating, dysuria and hematuria.   Musculoskeletal:  Positive for back pain. Negative for arthralgias and myalgias.   Neurological:  Negative for dizziness, weakness, light-headedness and headaches.   Psychiatric/Behavioral:  Positive for sleep disturbance (wakes frequently, hard time fallign asleep.).        The following portions of the patient's history were reviewed and updated as appropriate: allergies, current medications, past family history, past medical history, past social history, past surgical history and problem list.    Medications:     Current Outpatient Medications:     Abemaciclib (Verzenio) 150 MG tablet, Take 1 tablet by mouth 2 (Two) Times a Day., Disp: , Rfl:     clonazePAM (KlonoPIN) 0.5 MG tablet, Take 1 tablet by mouth., Disp: , Rfl:     famotidine (PEPCID) 40 MG tablet, Take  by mouth., Disp: , Rfl:     letrozole (FEMARA) 2.5 MG tablet, Take  by mouth., Disp: , Rfl:     levothyroxine (SYNTHROID, LEVOTHROID) 100 MCG tablet, Take  by mouth., Disp: , Rfl:     levothyroxine (SYNTHROID, LEVOTHROID) 88 MCG tablet, Take  by mouth., Disp: , Rfl:     Omega-3 Fatty Acids (fish oil)  1000 MG capsule capsule, Take 1 capsule by mouth Daily., Disp: , Rfl:     pantoprazole (PROTONIX) 40 MG EC tablet, , Disp: , Rfl:     sertraline (ZOLOFT) 25 MG tablet, Take  by mouth., Disp: , Rfl:     simvastatin (ZOCOR) 20 MG tablet, Take  by mouth., Disp: , Rfl:     vitamin D (ERGOCALCIFEROL) 54242 UNITS capsule capsule, Take 1 capsule by mouth Every 7 (Seven) Days. saturday, Disp: , Rfl:     naproxen sodium (ALEVE) 220 MG tablet, Take 1 tablet by mouth As Needed. (Patient not taking: Reported on 2024), Disp: , Rfl:     ondansetron ODT (ZOFRAN-ODT) 4 MG disintegrating tablet, Take  by mouth. (Patient not taking: Reported on 3/27/2024), Disp: , Rfl:     prochlorperazine (COMPAZINE) 10 MG tablet, Take  by mouth. (Patient not taking: Reported on 3/27/2024), Disp: , Rfl:     senna (SENOKOT) 8.6 MG tablet, Take  by mouth. (Patient not taking: Reported on 2024), Disp: , Rfl:     triamcinolone (KENALOG) 0.1 % ointment, Apply 1 Application topically to the appropriate area as directed 2 (Two) Times a Day. (Patient not taking: Reported on 2024), Disp: 30 g, Rfl: 0    Allergies:   Allergies   Allergen Reactions    Chlorhexidine Rash    Wound Dressing Adhesive Rash     Allergy noted with tegaderms.         Patient Smoking History:   Social History     Tobacco Use   Smoking Status Former    Current packs/day: 0.00    Average packs/day: 2.0 packs/day for 13.0 years (26.0 ttl pk-yrs)    Types: Cigarettes    Start date:     Quit date:     Years since quittin.4   Smokeless Tobacco Never       Measures:  PHQ-9 Total Score: 3   Quality of Life: 100 - Full Activity   ECOG score: 0  ECOG: (0) Fully active, able to carry on all predisease performance without restriction  Pain: (on a scale of 0-10)   Pain Score    24 1122   PainSc: 0-No pain       Objective     Physical Exam:   Vital Signs:   Vitals:    24 1122   BP: 110/73   Pulse: 67   Resp: 18   Temp: 97.5 °F (36.4 °C)   TempSrc: Temporal    SpO2: 100%   Weight: 53.3 kg (117 lb 8.1 oz)   PainSc: 0-No pain     Body mass index is 19.55 kg/m².   Wt Readings from Last 3 Encounters:   06/17/24 53.3 kg (117 lb 8.1 oz)   05/07/24 53.9 kg (118 lb 13.3 oz)   04/30/24 54.4 kg (119 lb 14.9 oz)       Physical Exam  Vitals reviewed.   Constitutional:       General: She is not in acute distress.     Appearance: Normal appearance. She is normal weight. She is not ill-appearing.   HENT:      Head: Normocephalic and atraumatic.      Mouth/Throat:      Mouth: Mucous membranes are moist.      Pharynx: Oropharynx is clear. No oropharyngeal exudate or posterior oropharyngeal erythema.   Eyes:      Conjunctiva/sclera: Conjunctivae normal.      Pupils: Pupils are equal, round, and reactive to light.   Cardiovascular:      Rate and Rhythm: Normal rate and regular rhythm.      Pulses: Normal pulses.      Heart sounds: Normal heart sounds.   Pulmonary:      Effort: Pulmonary effort is normal. No respiratory distress.      Breath sounds: Normal breath sounds.   Chest:   Breasts:     Right: Skin change (faint hyperpigmentation consistent with recent XRT) present. No swelling, bleeding, inverted nipple, mass, nipple discharge or tenderness.       Musculoskeletal:         General: Normal range of motion.      Cervical back: Normal range of motion.      Comments: Full ROM RUE   Lymphadenopathy:      Upper Body:      Right upper body: No supraclavicular or axillary adenopathy.   Skin:     General: Skin is warm and dry.   Neurological:      General: No focal deficit present.      Mental Status: She is alert and oriented to person, place, and time. Mental status is at baseline.   Psychiatric:         Mood and Affect: Mood normal.         Behavior: Behavior normal.       Result Review: I independently reviewed the following data.   -- All pathology results reviewed via Care Everywhere.  -- CT Chest W Contrast (11/21/2023 17:01)   -- NM Whole Body Bone Scan (08/25/2023 14:21)   -- CT  Chest, Abdomen, Pelvis W IV Contrast Without Oral Contrast (08/25/2023 11:21)     Pathology:   Lab Results   Component Value Date    CLININFO  07/28/2023     Unspecified malignant neoplasm of skin of breast      FINALDX  07/28/2023     1.  Right breast, 10 o'clock position, biopsy:  -  Invasive mammary carcinoma  - Histologic grade ([place] Histologic Score):    - Glandular (Acinar)/Tubular Differentiation:  Score 3  - Nuclear Pleomorphism:  Score 2  - Mitotic Rate:  Score 1  - Overall Grade:  Grade 2   - Size of largest focus of invasion:  4 mm   - Breast biomarker testing:    - Estrogen Receptor (ER):  Positive (95 %, strong)    - Progesterone Receptor (PgR):  Positive (90 %, strong)    - HER2 (by immunohistochemistry):  Negative (Score 0)  - Ki-67: 4 %         2.  Right axillary lymph node, biopsy:   - Small fragment of invasive mammary carcinoma   - No residual lymph node tissue present       Imaging: No radiology results for the last 90 days.     Labs:   WBC   Date Value Ref Range Status   08/17/2023 5.76 4.5 - 11.0 10*3/uL Final     Hemoglobin   Date Value Ref Range Status   08/17/2023 14.1 12.0 - 16.0 g/dL Final     Hematocrit   Date Value Ref Range Status   08/17/2023 44.8 36.0 - 46.0 % Final     Platelets   Date Value Ref Range Status   08/17/2023 240 140 - 440 10*3/uL Final     Creatinine   Date Value Ref Range Status   01/25/2019 0.81 0.57 - 1.00 mg/dL Final     BUN   Date Value Ref Range Status   01/25/2019 12 8 - 23 mg/dL Final     Assessment / Plan      Impression: Aislinn Ledesma is a pleasant 68 y.o. female with initial cT1c cN1 cM0 invasive ductal carcinoma with lobular features involving the right breast. Initial biopsy on 7/28/23 with pathology revealing grade 2 invasive carcinoma with both ductal and lobular features, ER/KY positive, HER2 negative, Ki-67 <10%. Oncotype 7. Right axillary biopsy with fibrofatty tissue with focus of carcinoma without kris breast tissue. She is status post right  breast lumpectomy with SLNB on 9/14/2023 with pathology revealing invasive lobular carcinoma, grade 2 measuring 16 mm, with 2/2 sentinel lymph nodes positive for carcinoma and no extranodal extension; [id number]. There was a lateral positive margin measuring over 8 mm. Repeat excision with additional lymph node dissection was performed on 11/2/2023 revealing residual invasive lobular carcinoma, grade 1, measuring 1.2 mm with a new positive margin as a less than 1 mm focus. Total of 4 lymph nodes removed at that surgery with 2 revealing metastatic disease. Final pathology staged as stage IB (pT1c, pN2, cM0, ER/GA+, HER2-). She completed adjuvant chemotherapy with Taxotere/Cytoxan. She is now status post external beam radiotherapy to the right breast and axilla, completed on 5/7/2024. Total of 5000 cGy in 25 fractions delivered to the right breast and axilla. This was followed by 10 Hill boost to the right breast in 5 fractions of 2 Gray. She tolerated radiotherapy well overall, experiencing only a grade 1 skin toxicity. She is doing well overall and remains without evidence of disease clinically. She is receiving letrozole and verzenio daily as well as Zometa every 6 months per Dr. Coley.     At risk for lymphedema: discussed risk for development of lymphedema as well as scarring/fibrosis. She has been evaluated by lymphedema clinic at [place]. Encouraged stretching exercises to address tightness that will inevitabily develop in the pectoral muscle and under the axilla. Handout of exercises provided today. She will follow-up with me in 6 months and we will revisit the conversation of establishing care with our Lymphedema Clinic in [facility]. In the interim, if she develops any symptoms discussed today and wishes to establish care sooner she will contact our office and I will place referral.     Pulmonary nodule: CT CAP on 11/21/23 demonstrates a stable 7 mm right upper lobe nodule. This nodule is unchanged compared to study  on 8/25/23. Dr. Coley plans to repeat CT chest in June 2024. Will continue to monitor.     Assessment/Plan:   Diagnoses and all orders for this visit:    1. Malignant neoplasm of upper-outer quadrant of right breast in female, estrogen receptor positive (Primary)    2. Secondary malignant neoplasm of axillary lymph nodes    3. Status post radiation therapy within four to twelve weeks    4. Encounter for follow-up examination after completed treatment for malignant neoplasm    5. Use of letrozole (Femara)    6. At risk for lymphedema    7. Pulmonary nodule       Follow Up:   Return for follow-up in 6 months.   Discussed recommendation per NCCN guidelines for waiting at least 6-months after completion of radiotherapy to begin annual mammogram surveillance (November 2024).  Follow-up with Dr. Coley as scheduled.       Return in about 6 months (around 12/17/2024) for Office Visit.  Aislinn ADAMS Ledesma was encouraged to contact me in the interim with any questions or concerns regarding her care.      BOOM Barajas  Radiation Oncology  [facility]    This document has been signed by BOOM Redding on June 17, 2024 13:09 EDT